# Patient Record
Sex: FEMALE | Race: WHITE
[De-identification: names, ages, dates, MRNs, and addresses within clinical notes are randomized per-mention and may not be internally consistent; named-entity substitution may affect disease eponyms.]

---

## 2022-03-08 ENCOUNTER — HOSPITAL ENCOUNTER (OUTPATIENT)
Dept: HOSPITAL 5 - OR | Age: 52
Discharge: HOME | End: 2022-03-08
Attending: SURGERY
Payer: COMMERCIAL

## 2022-03-08 VITALS — DIASTOLIC BLOOD PRESSURE: 84 MMHG | SYSTOLIC BLOOD PRESSURE: 132 MMHG

## 2022-03-08 DIAGNOSIS — E78.00: ICD-10-CM

## 2022-03-08 DIAGNOSIS — Z79.4: ICD-10-CM

## 2022-03-08 DIAGNOSIS — M19.90: ICD-10-CM

## 2022-03-08 DIAGNOSIS — Z79.899: ICD-10-CM

## 2022-03-08 DIAGNOSIS — E66.9: ICD-10-CM

## 2022-03-08 DIAGNOSIS — I12.0: Primary | ICD-10-CM

## 2022-03-08 DIAGNOSIS — Z98.51: ICD-10-CM

## 2022-03-08 DIAGNOSIS — Z87.440: ICD-10-CM

## 2022-03-08 DIAGNOSIS — N18.6: ICD-10-CM

## 2022-03-08 DIAGNOSIS — E11.22: ICD-10-CM

## 2022-03-08 DIAGNOSIS — Z98.891: ICD-10-CM

## 2022-03-08 DIAGNOSIS — Z98.890: ICD-10-CM

## 2022-03-08 LAB
BUN SERPL-MCNC: 70 MG/DL (ref 7–17)
BUN/CREAT SERPL: 14 %
CALCIUM SERPL-MCNC: 9 MG/DL (ref 8.4–10.2)
HCT VFR BLD CALC: 35.6 % (ref 30.3–42.9)
HEMOLYSIS INDEX: 99
HGB BLD-MCNC: 12.4 GM/DL (ref 10.1–14.3)
MCHC RBC AUTO-ENTMCNC: 35 % (ref 30–34)
MCV RBC AUTO: 84 FL (ref 79–97)
PLATELET # BLD: 259 K/MM3 (ref 140–440)
RBC # BLD AUTO: 4.24 M/MM3 (ref 3.65–5.03)

## 2022-03-08 PROCEDURE — 36821 AV FUSION DIRECT ANY SITE: CPT

## 2022-03-08 PROCEDURE — 81025 URINE PREGNANCY TEST: CPT

## 2022-03-08 PROCEDURE — 64415 NJX AA&/STRD BRCH PLXS IMG: CPT

## 2022-03-08 PROCEDURE — 64450 NJX AA&/STRD OTHER PN/BRANCH: CPT

## 2022-03-08 PROCEDURE — 36415 COLL VENOUS BLD VENIPUNCTURE: CPT

## 2022-03-08 PROCEDURE — 82962 GLUCOSE BLOOD TEST: CPT

## 2022-03-08 PROCEDURE — 80048 BASIC METABOLIC PNL TOTAL CA: CPT

## 2022-03-08 PROCEDURE — 85027 COMPLETE CBC AUTOMATED: CPT

## 2022-03-08 NOTE — ANESTHESIA CONSULTATION
Anesthesia Consult and Med Hx


Date of service: 03/08/22





- Airway


Anesthetic Teeth Evaluation: Dentures (upper dentures)


ROM Head & Neck: Adequate


Mental/Hyoid Distance: Adequate


Mallampati Class: Class III


Intubation Access Assessment: Possibly Difficult





- Pulmonary Exam


CTA: Yes





- Cardiac Exam


Cardiac Exam: RRR





- Pre-Operative Health Status


ASA Pre-Surgery Classification: ASA3


Proposed Anesthetic Plan: MAC


Nerve Block: Supraclavicular





- Pulmonary


Hx Smoking: No


Hx Asthma: No


Hx Respiratory Symptoms: No


Hx Sleep Apnea: No





- Cardiovascular System


Hx Hypertension: Yes (took carvedilol this AM)


Hx Heart Attack/AMI: No





- Central Nervous System


Hx Neuromuscular Disorder: No


Hx Back Pain: Yes (LOW BACK PAIN)


Hx Psychiatric Problems: No





- Gastrointestinal


Hx Gastroesophageal Reflux Disease: No





- Endocrine


Hx Renal Disease: Yes (Stage V CKD)


Hx Liver Disease: No


Hx Insulin Dependent Diabetes: Yes


Hx Thyroid Disease: No





- Hematic


Hx Anemia: No


Hx Sickle Cell Disease: No





- Other Systems


Hx Alcohol Use: No


Hx Substance Use: No


Hx Cancer: No


Hx Obesity: Yes (BMI 33)





- Additional Comments


Anesthesia Medical History Comments: No hx of anesthetic complications

## 2022-03-08 NOTE — OPERATIVE REPORT
Operative Report


Operative Report: 





Date of procedure: 3/8/2022


 


Pre-operative diagnosis:   Stage V Chronic Renal Insufficiency


 


Post-operative diagnosis: Same


 


Procedure(s):  Creation of Right Brachial Artery to Cephalic Vein Arteriovenous 

Fistula


 


Surgeon:  Ramon Olivier MD 


 


Assistant:  None


 


Anesthesia:  Regional/MAC


 


EBL:  Minimal


 


Counts:  Correct


 


Complications:  None


 


Condition:  Stable


 


Findings: Successful creation of right brachiocephalic arteriovenous fistula 

with palpable thrill and palpable radial pulse at the completion of the case.


 


Specimen:  None


 


Indications:





The patient is a 51-year-old female with a history of chronic renal 

insufficiency who was not yet on hemodialysis however it is anticipated that she

will require hemodialysis within the near future.  She will require permanent 

access to avoid a permacath placement.  She was worked up and found to be a 

suitable candidate for creation of an arteriovenous fistula.  She was given the 

risk, benefits, and alternative procedures and consented to the procedure.


 


Description of Procedure: 


 


The patient had a regional block of the patient's right arm was performed in the

preoperative area prior to being transported to the operating room.  Once the 

regional block was performed the patient was transported to the operating room 

and adequate sedation was given.  When the patient was sedated a timeout was 

performed and the patient's right arm was then prepped and draped in normal 

sterile fashion.  A transverse incision was then made and carried down to the 

cephalic vein using sharp dissection.  The vein was dissected out both 

proximally and distally and suture ligated and divided distally. I flushed the 

vein with heparinized saline and flow was controlled with a bulldog clamp.  I 

then dissected out the brachial artery through this incision circumferentially 

both proximal and distal and controlled the artery with vessel loops.  I 

systemically heparinized the patient with 3000 units of heparin IV and used 

angled DeBakey clamps to control flow through the artery.  I created an 

arteriotomy using an 11 blade and Rodriguez scissors.  I created an end to side 

anastomosis between the cephalic vein and brachial artery using a 6-0 Prolene in

running fashion.  Prior to completing the anastomosis I flashed the artery both 

proximally and distally and then flushed the anastomosis with heparinized saline

to remove any debris.  I then completed the anastomosis and removed all clamps 

allowing flow into the fistula which had an adequate thrill.  I achieved 

hemostasis with a combination of Surgicel and electrocautery.  Once hemostasis 

had been achieved I closed the wound in 2 layers using a 3-0 Vicryl in a running

fashion in the deep dermal layer and a 4-0 Monocryl in  running fashion in the 

subcuticular layer.  I then dressed the wound with Dermabond.  The patient 

tolerated the procedure well.  All sponge, needle, and instrument counts were 

correct.  The patient was taken to the recovery area in stable condition.

## 2022-03-08 NOTE — SHORT STAY SUMMARY
Short Stay Documentation


Date of service: 03/08/22


Narrative H&P: 





See H&P





- History


H&P: obtained from office





- Allergies and Medications


Current Medications: 


                                    Allergies





No Known Allergies Allergy (Verified 03/02/22 15:12)


   





                                Home Medications











 Medication  Instructions  Recorded  Confirmed  Last Taken  Type


 


Ergocalciferol(Vitamin D2)(Nf) 50 mcg PO 1XW 03/02/22 03/02/22 Unknown History





[Vitamin D (Nf)]     


 


Insulin Aspart Protam & Aspart 15 unit SQ BID 03/02/22 03/02/22 Unknown History





[NovoLOG Mix 70-30 Flexpen]     


 


carvediloL [Coreg] 25 mg PO BID 03/02/22 03/08/22 03/08/22 History


 


oxyCODONE /ACETAMINOPHEN [Percocet 1 tab PO Q6HR PRN 03/02/22 03/02/22 Unknown 

History





5/325]     








Active Medications





Cefazolin Sodium (Cefazolin/Sterile Water 2 Gm/20 Ml Syringe)  2 gm IV PREOP NR


   Stop: 03/08/22 21:00


Fentanyl (Fentanyl 100 Mcg/2 Ml Inj)  100 mcg IV ONCE PRN


   PRN Reason: sedation for nerve block


   Stop: 03/08/22 20:00


   Last Admin: 03/08/22 08:00 Dose:  100 mcg


   


Hydralazine HCl (Hydralazine 20 Mg/1 Ml Inj)  10 mg IV Q20M PRN


   PRN Reason: Hypertension


Sodium Chloride (Nacl 0.9% 1000 Ml)  1,000 mls @ 42 mls/hr IV AS DIRECT MARJ


   Stop: 03/08/22 23:59


   Last Admin: 03/08/22 07:00 Dose:  42 mls/hr


   


Midazolam HCl (Midazolam 2 Mg/2 Ml Inj)  2 mg IV PREOP NR


   Stop: 03/08/22 20:00


   Last Admin: 03/08/22 08:00 Dose:  2 mg


   











- Brief post op/procedure progress note


Date of procedure: 03/08/22


Pre-op diagnosis: Chronic Renal Insufficiency Stage V


Post-op diagnosis: same


Procedure: 





Creation of Right Brachiocephalic Arteriovenous Fistula


Anesthesia: MAC, regional


Surgeon: JACKI COVINGTON


Estimated blood loss: minimal


Pathology: none


Condition: stable





- Disposition


Condition at discharge: Good


Disposition: 01 HOME / SELF CARE / HOMELESS





Short Stay Discharge Plan


Activity: other (No heavy lifting with right arm for 2 weeks.  Use stress ball 

with right hand as often as possible.)


Wound: open to air, keep clean and dry, other (Okay to shower and wash the right

arm incision with soap and water but do not soak in water for 2 weeks.)


Follow up with: 


JACKI COVINGTON MD [Staff Physician] - 14 Days


Prescriptions: 


oxyCODONE /ACETAMINOPHEN [Percocet 5/325] 1 tab PO Q4HR #30 tab

## 2022-09-02 ENCOUNTER — HOSPITAL ENCOUNTER (INPATIENT)
Dept: HOSPITAL 5 - ED | Age: 52
LOS: 7 days | Discharge: HOME | DRG: 673 | End: 2022-09-09
Attending: STUDENT IN AN ORGANIZED HEALTH CARE EDUCATION/TRAINING PROGRAM | Admitting: INTERNAL MEDICINE
Payer: MEDICARE

## 2022-09-02 DIAGNOSIS — Z20.822: ICD-10-CM

## 2022-09-02 DIAGNOSIS — E87.2: ICD-10-CM

## 2022-09-02 DIAGNOSIS — E83.51: ICD-10-CM

## 2022-09-02 DIAGNOSIS — E66.9: ICD-10-CM

## 2022-09-02 DIAGNOSIS — M19.90: ICD-10-CM

## 2022-09-02 DIAGNOSIS — E87.5: ICD-10-CM

## 2022-09-02 DIAGNOSIS — E11.65: ICD-10-CM

## 2022-09-02 DIAGNOSIS — N18.6: ICD-10-CM

## 2022-09-02 DIAGNOSIS — N17.9: ICD-10-CM

## 2022-09-02 DIAGNOSIS — E11.22: ICD-10-CM

## 2022-09-02 DIAGNOSIS — Z71.6: ICD-10-CM

## 2022-09-02 DIAGNOSIS — E87.1: ICD-10-CM

## 2022-09-02 DIAGNOSIS — Z71.3: ICD-10-CM

## 2022-09-02 DIAGNOSIS — Z79.4: ICD-10-CM

## 2022-09-02 DIAGNOSIS — I12.0: Primary | ICD-10-CM

## 2022-09-02 LAB
ALBUMIN SERPL-MCNC: 3.8 G/DL (ref 3.9–5)
ALT SERPL-CCNC: 6 UNITS/L (ref 7–56)
BASOPHILS # (AUTO): 0.1 K/MM3 (ref 0–0.1)
BASOPHILS NFR BLD AUTO: 0.6 % (ref 0–1.8)
BUN SERPL-MCNC: 66 MG/DL (ref 7–17)
BUN/CREAT SERPL: 10 %
CALCIUM SERPL-MCNC: 8.2 MG/DL (ref 8.4–10.2)
EOSINOPHIL # BLD AUTO: 0.4 K/MM3 (ref 0–0.4)
EOSINOPHIL NFR BLD AUTO: 4.9 % (ref 0–4.3)
HCT VFR BLD CALC: 36.7 % (ref 30.3–42.9)
HEMOLYSIS INDEX: 47
HGB BLD-MCNC: 11.9 GM/DL (ref 10.1–14.3)
LYMPHOCYTES # BLD AUTO: 1.1 K/MM3 (ref 1.2–5.4)
LYMPHOCYTES NFR BLD AUTO: 11.9 % (ref 13.4–35)
MCHC RBC AUTO-ENTMCNC: 32 % (ref 30–34)
MCV RBC AUTO: 84 FL (ref 79–97)
MONOCYTES # (AUTO): 0.3 K/MM3 (ref 0–0.8)
MONOCYTES % (AUTO): 3.5 % (ref 0–7.3)
PLATELET # BLD: 263 K/MM3 (ref 140–440)
RBC # BLD AUTO: 4.37 M/MM3 (ref 3.65–5.03)

## 2022-09-02 PROCEDURE — 82962 GLUCOSE BLOOD TEST: CPT

## 2022-09-02 PROCEDURE — 80048 BASIC METABOLIC PNL TOTAL CA: CPT

## 2022-09-02 PROCEDURE — 84703 CHORIONIC GONADOTROPIN ASSAY: CPT

## 2022-09-02 PROCEDURE — 64450 NJX AA&/STRD OTHER PN/BRANCH: CPT

## 2022-09-02 PROCEDURE — 36415 COLL VENOUS BLD VENIPUNCTURE: CPT

## 2022-09-02 PROCEDURE — 81001 URINALYSIS AUTO W/SCOPE: CPT

## 2022-09-02 PROCEDURE — C1752 CATH,HEMODIALYSIS,SHORT-TERM: HCPCS

## 2022-09-02 PROCEDURE — C1768 GRAFT, VASCULAR: HCPCS

## 2022-09-02 PROCEDURE — 80053 COMPREHEN METABOLIC PANEL: CPT

## 2022-09-02 PROCEDURE — 85025 COMPLETE CBC W/AUTO DIFF WBC: CPT

## 2022-09-02 PROCEDURE — U0003 INFECTIOUS AGENT DETECTION BY NUCLEIC ACID (DNA OR RNA); SEVERE ACUTE RESPIRATORY SYNDROME CORONAVIRUS 2 (SARS-COV-2) (CORONAVIRUS DISEASE [COVID-19]), AMPLIFIED PROBE TECHNIQUE, MAKING USE OF HIGH THROUGHPUT TECHNOLOGIES AS DESCRIBED BY CMS-2020-01-R: HCPCS

## 2022-09-02 PROCEDURE — 36581 REPLACE TUNNELED CV CATH: CPT

## 2022-09-02 PROCEDURE — C1750 CATH, HEMODIALYSIS,LONG-TERM: HCPCS

## 2022-09-02 PROCEDURE — 80074 ACUTE HEPATITIS PANEL: CPT

## 2022-09-02 PROCEDURE — 85007 BL SMEAR W/DIFF WBC COUNT: CPT

## 2022-09-02 PROCEDURE — 84100 ASSAY OF PHOSPHORUS: CPT

## 2022-09-02 PROCEDURE — 71045 X-RAY EXAM CHEST 1 VIEW: CPT

## 2022-09-02 PROCEDURE — C1769 GUIDE WIRE: HCPCS

## 2022-09-02 PROCEDURE — 93005 ELECTROCARDIOGRAM TRACING: CPT

## 2022-09-02 PROCEDURE — 36556 INSERT NON-TUNNEL CV CATH: CPT

## 2022-09-03 LAB
RBC #/AREA URNS HPF: < 1 /HPF (ref 0–6)
WBC #/AREA URNS HPF: < 1 /HPF (ref 0–6)

## 2022-09-03 PROCEDURE — B548ZZA ULTRASONOGRAPHY OF SUPERIOR VENA CAVA, GUIDANCE: ICD-10-PCS | Performed by: STUDENT IN AN ORGANIZED HEALTH CARE EDUCATION/TRAINING PROGRAM

## 2022-09-03 PROCEDURE — B5181ZA FLUOROSCOPY OF SUPERIOR VENA CAVA USING LOW OSMOLAR CONTRAST, GUIDANCE: ICD-10-PCS | Performed by: STUDENT IN AN ORGANIZED HEALTH CARE EDUCATION/TRAINING PROGRAM

## 2022-09-03 PROCEDURE — 5A1D70Z PERFORMANCE OF URINARY FILTRATION, INTERMITTENT, LESS THAN 6 HOURS PER DAY: ICD-10-PCS | Performed by: INTERNAL MEDICINE

## 2022-09-03 PROCEDURE — 02H633Z INSERTION OF INFUSION DEVICE INTO RIGHT ATRIUM, PERCUTANEOUS APPROACH: ICD-10-PCS | Performed by: STUDENT IN AN ORGANIZED HEALTH CARE EDUCATION/TRAINING PROGRAM

## 2022-09-03 RX ADMIN — INSULIN LISPRO SCH UNIT: 100 INJECTION, SOLUTION INTRAVENOUS; SUBCUTANEOUS at 16:30

## 2022-09-03 RX ADMIN — HEPARIN SODIUM ONE UNIT: 1000 INJECTION, SOLUTION INTRAVENOUS; SUBCUTANEOUS at 09:48

## 2022-09-03 RX ADMIN — INSULIN LISPRO SCH: 100 INJECTION, SOLUTION INTRAVENOUS; SUBCUTANEOUS at 22:38

## 2022-09-03 RX ADMIN — HYDRALAZINE HYDROCHLORIDE PRN MG: 20 INJECTION INTRAMUSCULAR; INTRAVENOUS at 06:49

## 2022-09-03 RX ADMIN — Medication SCH ML: at 22:37

## 2022-09-03 RX ADMIN — INSULIN LISPRO SCH: 100 INJECTION, SOLUTION INTRAVENOUS; SUBCUTANEOUS at 11:30

## 2022-09-03 RX ADMIN — HEPARIN SODIUM ONE UNIT: 1000 INJECTION, SOLUTION INTRAVENOUS; SUBCUTANEOUS at 09:47

## 2022-09-03 RX ADMIN — Medication SCH ML: at 13:46

## 2022-09-03 RX ADMIN — INSULIN LISPRO SCH UNIT: 100 INJECTION, SOLUTION INTRAVENOUS; SUBCUTANEOUS at 07:30

## 2022-09-03 NOTE — EVENT NOTE
Date: 09/03/22





Patient seen and examined


52-year-old female presented to the hospital to establish new dialysis


Placed on PermCath by vascular, plan to initiate dialysis today


Patient will need outpatient dialysis center set up


Continue to follow clinically

## 2022-09-03 NOTE — OPERATIVE REPORT
Operative Report


Operative Report: 





Exam: Ultrasound and fluoroscopic guided placement of Vas-Cath





Clinical indication: Patient with a history of end-stage renal disease who was 

progressed to require acute dialysis initiation





Date: 9/3/2022





Procedure: Following an explanation of the risk, benefits and alternatives; 

written informed consent was obtained.  The patient was brought to the 

angiographic suite and placed in supine position on the examination table.  

Initial ultrasound evaluation of her right neck demonstrated a patent right int

ernal jugular vein.  The patient's right neck and chest wall were prepped and 

draped in the usual sterile fashion.  2% lidocaine was used for anesthesia.





Under ultrasound guidance, the right internal jugular vein was cannulated using 

a 7 cm 18-gauge needle.  A 0.035 guidewire was then advanced into the IVC under 

fluoroscopy to document intravenous positioning and for anchoring.  The needle 

was removed and following serial dilation over the guidewire under fluoroscopy, 

a 15 cm precurved Vas-Cath was placed over the guidewire under fluoroscopy and 

positioned with the tip in the proximal right atrium.  The guidewire was 

removed.  Both ports flushed and aspirated easily and were then locked with 

appropriate volumes of heparin.  The catheter was securely fastened to the skin 

surface using 2-0 Ethilon suture and a sterile dressing applied.





The patient tolerated the procedure well.  There were no immediate postprocedure

complications.





Conscious sedation was performed under the guidance of radiologic nursing.  

Continuous cardiopulmonary monitoring was utilized.





Impression: Ultrasound and fluoroscopic guided placement of Vas-Cath via the 

right internal jugular vein.

## 2022-09-03 NOTE — CONSULTATION
History of Present Illness





- Reason for Consult


Consult date: 09/03/22


end stage renal disease





Past History


Past Medical History: arthritis, diabetes, hypertension, renal failure


Past Surgical History: No surgical history


Social history: no significant social history


Family history: no significant family history





Medications and Allergies


                                    Allergies











Allergy/AdvReac Type Severity Reaction Status Date / Time


 


No Known Allergies Allergy   Verified 03/02/22 15:12











                                Home Medications











 Medication  Instructions  Recorded  Confirmed  Last Taken  Type


 


Ergocalciferol(Vitamin D2)(Nf) 50 mcg PO 1XW 03/02/22 09/03/22 08/28/22 History





[Vitamin D (Nf)]     


 


Insulin Aspart Protam & Aspart 20 unit SQ BID 03/02/22 09/03/22 1 Day Ago Hist

ory





[NovoLOG Mix 70-30 Flexpen]    ~09/02/22 


 


carvediloL [Coreg] 25 mg PO BID 03/02/22 09/03/22 1 Day Ago History





    ~09/02/22 


 


oxyCODONE /ACETAMINOPHEN [Percocet 1 tab PO Q6HR PRN 03/02/22 09/03/22 Unknown 

History





5/325]     











Active Meds: 


Active Medications





Acetaminophen (Acetaminophen 325 Mg Tab)  650 mg PO Q4H PRN


   PRN Reason: Pain MILD(1-3)/Fever >100.5/HA


Dextrose (Dextrose 50% In Water (25gm) 50 Ml Syringe)  0 ml IV Q30MIN PRN; 

Protocol


   PRN Reason: Hypoglycemia


Heparin Sodium (Porcine) (Heparin 10,000 Units/10 Ml Vial)  3,000 unit IV ANANT 

PRN


   PRN Reason: hemodialysis


Hydralazine HCl (Hydralazine 20 Mg/1 Ml Inj)  10 mg IV Q4H PRN


   PRN Reason: SBP greater than 160


   Last Admin: 09/03/22 06:49 Dose:  10 mg


   


Sodium Chloride (Nacl 0.9%)  100 mls @ 999 mls/hr IV ANANT PRN


   PRN Reason: Hypotension


Insulin Human Lispro (Insulin Lispro 100 Unit/Ml)  0 unit SUB-Q ACHS Count includes the [facility]; 

Protocol


   Last Admin: 09/03/22 07:30 Dose:  6 unit


   


Magnesium Hydroxide (Magnesium Hydroxide (Mom) Oral Liqd Udc)  30 ml PO Q4H PRN


   PRN Reason: Constipation


Morphine Sulfate (Morphine 2 Mg/1 Ml Inj)  2 mg IV Q4H PRN


   PRN Reason: Pain, Moderate (4-6)


Morphine Sulfate (Morphine 4 Mg/1 Ml Inj)  4 mg IV Q4H PRN


   PRN Reason: Pain , Severe (7-10)


Ondansetron HCl (Ondansetron 4 Mg/2 Ml Inj)  4 mg IV Q6H PRN


   PRN Reason: Nausea And Vomiting


Sodium Chloride (Sodium Chloride 0.9% 10 Ml Flush Syringe)  10 ml IV BID MARJ


Sodium Chloride (Sodium Chloride 0.9% 10 Ml Flush Syringe)  10 ml IV PRN PRN


   PRN Reason: LINE FLUSH











Exam





- Vital Signs


Vital signs: 


                                   Vital Signs











Temp Pulse Resp BP Pulse Ox


 


 98.7 F   72   20   193/82   99 


 


 09/02/22 13:01  09/02/22 13:01  09/02/22 13:01  09/02/22 13:01  09/02/22 13:01














Results





- Lab Results





                                 09/04/22 05:21





                                 09/04/22 05:21


                             Most recent lab results











Calcium  8.2 mg/dL (8.4-10.2)  L  09/02/22  13:27    














Assessment and Plan


1. ESRD:


Patient with known h/o CKD-5.


Progressed to ESRD and presented with uremia.


Patient require hemodialysis due to worsening renal function and associated 

hyperkalemia, metabolic acidosis and uremia.


D/w patient about the indications, benefits, risks and alternatives involved in 

hemodialysis.  She voiced understanding and gave verbal consent.


Vascular consulted for hemodialysis catheter placement.


Hemodialysis: 9/3.


 


2. FEN:


Hyperkalemia, HD today.


Metabolic acidosis, HD today.


Monitor lytes and volume status.





3. Hypertension:


Adjust meds as needed.


Volume control.


Monitor BP.





4. DM-2:


SSI.





5. Tobacco smoking:


Counseled.











Subjective:


Patient was seen and examined at the bedside.











Examination:


General appearance: well-developed, well nourished, appears stated age, not in 

distress


HEENT: no icterus


Neck: trachea midline


Respiratory: ctab


Heart: S1S2, regular, no murmur


Abdomen: soft, bowel sounds heard, NT, no palpable mass


Integumentary: no obvious rash


Neurologic: AO, conversing, able to move extremities


Ext: no edema


Hemodialysis access: R IJ non-tunnel catheter

## 2022-09-03 NOTE — CONSULTATION
History of Present Illness





- Reason for Consult


Consult date: 09/03/22





- History of Present Illness





Patient with a history of diabetes and end-stage renal disease who has 

progressed to the point of requiring dialysis.  She was sent to the ER for 

dialysis access and initiation of dialysis.  Patient was subsequently admitted 

and consult placed for dialysis access





Past History


Past Medical History: arthritis, diabetes, hypertension, renal failure


Past Surgical History: No surgical history


Social history: no significant social history


Family history: no significant family history





Medications and Allergies


                                    Allergies











Allergy/AdvReac Type Severity Reaction Status Date / Time


 


No Known Allergies Allergy   Verified 03/02/22 15:12











                                Home Medications











 Medication  Instructions  Recorded  Confirmed  Last Taken  Type


 


Ergocalciferol(Vitamin D2)(Nf) 50 mcg PO 1XW 03/02/22 09/03/22 08/28/22 History





[Vitamin D (Nf)]     


 


Insulin Aspart Protam & Aspart 20 unit SQ BID 03/02/22 09/03/22 1 Day Ago 

History





[NovoLOG Mix 70-30 Flexpen]    ~09/02/22 


 


carvediloL [Coreg] 25 mg PO BID 03/02/22 09/03/22 1 Day Ago History





    ~09/02/22 


 


oxyCODONE /ACETAMINOPHEN [Percocet 1 tab PO Q6HR PRN 03/02/22 09/03/22 Unknown 

History





5/325]     











Active Meds: 


Active Medications





Acetaminophen (Acetaminophen 325 Mg Tab)  650 mg PO Q4H PRN


   PRN Reason: Pain MILD(1-3)/Fever >100.5/HA


Dextrose (Dextrose 50% In Water (25gm) 50 Ml Syringe)  0 ml IV Q30MIN PRN; 

Protocol


   PRN Reason: Hypoglycemia


Heparin Sodium (Porcine) (Heparin 10,000 Units/10 Ml Vial)  3,000 unit IV ANANT 

PRN


   PRN Reason: hemodialysis


Hydralazine HCl (Hydralazine 20 Mg/1 Ml Inj)  10 mg IV Q4H PRN


   PRN Reason: SBP greater than 160


   Last Admin: 09/03/22 06:49 Dose:  10 mg


   


Sodium Chloride (Nacl 0.9%)  100 mls @ 999 mls/hr IV ANANT PRN


   PRN Reason: Hypotension


Insulin Human Lispro (Insulin Lispro 100 Unit/Ml)  0 unit SUB-Q ACHS MARJ; 

Protocol


   Last Admin: 09/03/22 07:30 Dose:  6 unit


   


Magnesium Hydroxide (Magnesium Hydroxide (Mom) Oral Liqd Udc)  30 ml PO Q4H PRN


   PRN Reason: Constipation


Morphine Sulfate (Morphine 2 Mg/1 Ml Inj)  2 mg IV Q4H PRN


   PRN Reason: Pain, Moderate (4-6)


Morphine Sulfate (Morphine 4 Mg/1 Ml Inj)  4 mg IV Q4H PRN


   PRN Reason: Pain , Severe (7-10)


Ondansetron HCl (Ondansetron 4 Mg/2 Ml Inj)  4 mg IV Q6H PRN


   PRN Reason: Nausea And Vomiting


Sodium Chloride (Sodium Chloride 0.9% 10 Ml Flush Syringe)  10 ml IV BID MARJ


Sodium Chloride (Sodium Chloride 0.9% 10 Ml Flush Syringe)  10 ml IV PRN PRN


   PRN Reason: LINE FLUSH











Review of Systems


All systems: negative





Exam





- Constitutional


Vitals: 


                                        











Temp Pulse Resp BP Pulse Ox


 


 98.8 F   82   18   193/83   96 


 


 09/03/22 06:24  09/03/22 06:24  09/03/22 06:24  09/03/22 06:49  09/03/22 06:24











General appearance: Present: no acute distress





- EENT


Eyes: Present: EOM intact


ENT: hearing intact





- Neck


Neck: Present: supple, normal ROM





- Respiratory


Respiratory effort: normal





- Abdominal


General gastrointestinal: Present: deferred





- Rectal


Rectal Exam: deferred





- Psychiatric


Psychiatric: appropriate mood/affect, cooperative





Results





- Labs


CBC & Chem 7: 


                                 09/02/22 13:27





                                 09/02/22 13:27


Labs: 


                              Abnormal lab results











  09/02/22 09/02/22 Range/Units





  13:27 13:27 


 


MCH  27 L   (28-32)  pg


 


Lymph % (Auto)  11.9 L   (13.4-35.0)  %


 


Eos % (Auto)  4.9 H   (0.0-4.3)  %


 


Lymph # (Auto)  1.1 L   (1.2-5.4)  K/mm3


 


Seg Neutrophils %  79.1 H   (40.0-70.0)  %


 


Sodium   129 L  (137-145)  mmol/L


 


Potassium   5.3 H  (3.6-5.0)  mmol/L


 


Carbon Dioxide   19 L  (22-30)  mmol/L


 


BUN   66 H  (7-17)  mg/dL


 


Creatinine   6.9 H  (0.6-1.2)  mg/dL


 


Glucose   318 H  ()  mg/dL


 


Calcium   8.2 L  (8.4-10.2)  mg/dL


 


ALT   6 L  (7-56)  units/L


 


Alkaline Phosphatase   131 H  ()  units/L


 


Albumin   3.8 L  (3.9-5)  g/dL














Assessment and Plan





Patient will be brought down to the Cath Lab today for placement of a Vas-Cath 

for dialysis initiation.  Depending on nephrology recommendations, this can be 

converted to a PermCath next week.

## 2022-09-03 NOTE — EMERGENCY DEPARTMENT REPORT
ED General Adult HPI





- General


Chief complaint: Weakness


Stated complaint: DIALYSIS


Time Seen by Provider: 09/02/22 22:56


Source: patient


Mode of arrival: Ambulatory


Limitations: No Limitations





- History of Present Illness


Initial comments: 





Patient is a 52-year-old female sent by Dr. Barillas for dialysis.  Patient 

reports generalized weakness, decreased appetite, malaise and insomnia over the 

past several days.  Was found to be in acute renal failure per outside labs.  

Patient has history of insulin-dependent diabetes and hypertension.


Severity scale (0 -10): 0





- Related Data


                                Home Medications











 Medication  Instructions  Recorded  Confirmed  Last Taken


 


Ergocalciferol(Vitamin D2)(Nf) 50 mcg PO 1XW 03/02/22 09/03/22 08/28/22





[Vitamin D (Nf)]    


 


Insulin Aspart Protam & Aspart 20 unit SQ BID 03/02/22 09/03/22 1 Day Ago





[NovoLOG Mix 70-30 Flexpen]    ~09/02/22


 


carvediloL [Coreg] 25 mg PO BID 03/02/22 09/03/22 1 Day Ago





    ~09/02/22


 


oxyCODONE /ACETAMINOPHEN [Percocet 1 tab PO Q6HR PRN 03/02/22 09/03/22 Unknown





5/325]    











                                    Allergies











Allergy/AdvReac Type Severity Reaction Status Date / Time


 


No Known Allergies Allergy   Verified 03/02/22 15:12














ED Review of Systems


ROS: 


Stated complaint: DIALYSIS


Other details as noted in HPI





Constitutional: malaise, weakness


Respiratory: denies: cough, shortness of breath, wheezing


Cardiovascular: denies: chest pain, palpitations


Gastrointestinal: denies: abdominal pain, nausea, diarrhea


Genitourinary: denies: urgency, dysuria, discharge


Musculoskeletal: denies: back pain, joint swelling, arthralgia


Skin: denies: rash, lesions


Neurological: denies: headache, weakness, paresthesias


Psychiatric: denies: anxiety, depression





ED Past Medical Hx





- Past Medical History


Hx Hypertension: Yes (took carvedilol this AM)


Hx Heart Attack/AMI: No


Hx Diabetes: Yes


Hx Liver Disease: No


Hx Renal Disease: Yes (Stage V CKD)


Hx Sickle Cell Disease: No


Hx Arthritis: Yes (BOTH LEGS AND FINGERS)


Hx Asthma: No


Hx HIV: No





- Social History


Smoking Status: Never Smoker





- Medications


Home Medications: 


                                Home Medications











 Medication  Instructions  Recorded  Confirmed  Last Taken  Type


 


Ergocalciferol(Vitamin D2)(Nf) 50 mcg PO 1XW 03/02/22 09/03/22 08/28/22 History





[Vitamin D (Nf)]     


 


Insulin Aspart Protam & Aspart 20 unit SQ BID 03/02/22 09/03/22 1 Day Ago 

History





[NovoLOG Mix 70-30 Flexpen]    ~09/02/22 


 


carvediloL [Coreg] 25 mg PO BID 03/02/22 09/03/22 1 Day Ago History





    ~09/02/22 


 


oxyCODONE /ACETAMINOPHEN [Percocet 1 tab PO Q6HR PRN 03/02/22 09/03/22 Unknown 

History





5/325]     














ED Physical Exam





- General


Limitations: No Limitations


General appearance: alert, in no apparent distress





- Head


Head exam: Present: atraumatic, normocephalic





- Respiratory


Respiratory exam: Present: normal lung sounds bilaterally.  Absent: respiratory 

distress





- Cardiovascular


Cardiovascular Exam: Present: regular rate, normal rhythm, normal heart sounds





- GI/Abdominal


GI/Abdominal exam: Present: soft.  Absent: distended, tenderness





- Rectal


Rectal exam: Present: deferred





- Neurological Exam


Neurological exam: Present: alert, oriented X3





- Psychiatric


Psychiatric exam: Present: normal affect, normal mood





- Skin


Skin exam: Present: warm, dry, intact, normal color





ED Course


                                   Vital Signs











  09/02/22 09/02/22 09/02/22





  13:01 23:01 23:53


 


Temperature 98.7 F 98.1 F 


 


Pulse Rate 72  70


 


Respiratory 20  14





Rate   


 


Blood Pressure   


 


Blood Pressure 193/82  194/73





[Left]   


 


O2 Sat by Pulse 99  99





Oximetry   














  09/02/22 09/03/22 09/03/22





  23:56 00:01 00:07


 


Temperature   


 


Pulse Rate  69 


 


Respiratory 16 14 





Rate   


 


Blood Pressure  202/82 202/82


 


Blood Pressure   





[Left]   


 


O2 Sat by Pulse  99 





Oximetry   














  09/03/22 09/03/22 09/03/22





  00:09 00:17 00:23


 


Temperature   


 


Pulse Rate   64


 


Respiratory   





Rate   


 


Blood Pressure   


 


Blood Pressure   193/75





[Left]   


 


O2 Sat by Pulse 100 100 





Oximetry   














  09/03/22 09/03/22 09/03/22





  00:31 00:45 00:51


 


Temperature   


 


Pulse Rate 70 67 68


 


Respiratory 15 13 





Rate   


 


Blood Pressure 187/83 186/79 186/79


 


Blood Pressure   





[Left]   


 


O2 Sat by Pulse 99 99 





Oximetry   














  09/03/22 09/03/22 09/03/22





  01:01 01:15 01:31


 


Temperature   


 


Pulse Rate 72 79 72


 


Respiratory 18 18 14





Rate   


 


Blood Pressure 184/82 163/76 162/73


 


Blood Pressure   





[Left]   


 


O2 Sat by Pulse 99 98 97





Oximetry   














  09/03/22 09/03/22 09/03/22





  01:45 02:01 02:15


 


Temperature   


 


Pulse Rate 79 78 80


 


Respiratory 25 H 10 L 14





Rate   


 


Blood Pressure 159/73 141/64 154/67


 


Blood Pressure   





[Left]   


 


O2 Sat by Pulse 97 97 98





Oximetry   














  09/03/22 09/03/22





  02:30 02:49


 


Temperature  


 


Pulse Rate  78


 


Respiratory  19





Rate  


 


Blood Pressure 165/68 165/68


 


Blood Pressure  





[Left]  


 


O2 Sat by Pulse 99 98





Oximetry  














ED Medical Decision Making





- Lab Data


Result diagrams: 


                                 09/02/22 13:27





                                 09/02/22 13:27





- Medical Decision Making





Patient sent by  for acute renal failure and urgent dialysis.  

Creatinine 6.9.  Potassium 5.3.  No acute EKG changes noted.  I discussed case 

with on-call vascular surgeon Dr. العلي who will consult for placement of Vas-

Cath later this morning.  I contacted Dr. Currie regarding today's lab results.

  Will consult for dialysis later today.  Dr. Leavitt to admit.


Critical care attestation.: 


If time is entered above; I have spent that time in minutes in the direct care 

of this critically ill patient, excluding procedure time.








ED Disposition


Clinical Impression: 


 Acute renal failure, Hyperkalemia





Disposition: 09 ADMITTED AS INPATIENT


Is pt being admited?: Yes


Condition: Stable

## 2022-09-04 LAB
ANISOCYTOSIS BLD QL SMEAR: (no result)
BAND NEUTROPHILS # (MANUAL): 0.1 K/MM3
BUN SERPL-MCNC: 42 MG/DL (ref 7–17)
BUN/CREAT SERPL: 7 %
CALCIUM SERPL-MCNC: 8.2 MG/DL (ref 8.4–10.2)
HCT VFR BLD CALC: 34 % (ref 30.3–42.9)
HEMOLYSIS INDEX: 3
HGB BLD-MCNC: 11.3 GM/DL (ref 10.1–14.3)
MCHC RBC AUTO-ENTMCNC: 33 % (ref 30–34)
MCV RBC AUTO: 84 FL (ref 79–97)
MYELOCYTES # (MANUAL): 0 K/MM3
PLATELET # BLD: 206 K/MM3 (ref 140–440)
PROMYELOCYTES # (MANUAL): 0 K/MM3
RBC # BLD AUTO: 4.04 M/MM3 (ref 3.65–5.03)
TOTAL CELLS COUNTED BLD: 100

## 2022-09-04 RX ADMIN — HYDRALAZINE HYDROCHLORIDE PRN MG: 20 INJECTION INTRAMUSCULAR; INTRAVENOUS at 06:11

## 2022-09-04 RX ADMIN — ACETAMINOPHEN PRN MG: 325 TABLET ORAL at 09:01

## 2022-09-04 RX ADMIN — INSULIN LISPRO SCH UNIT: 100 INJECTION, SOLUTION INTRAVENOUS; SUBCUTANEOUS at 22:34

## 2022-09-04 RX ADMIN — ONDANSETRON PRN MG: 2 INJECTION INTRAMUSCULAR; INTRAVENOUS at 08:52

## 2022-09-04 RX ADMIN — Medication SCH: at 11:55

## 2022-09-04 RX ADMIN — Medication SCH ML: at 22:34

## 2022-09-04 RX ADMIN — INSULIN LISPRO SCH UNIT: 100 INJECTION, SOLUTION INTRAVENOUS; SUBCUTANEOUS at 16:30

## 2022-09-04 RX ADMIN — INSULIN LISPRO SCH UNIT: 100 INJECTION, SOLUTION INTRAVENOUS; SUBCUTANEOUS at 07:30

## 2022-09-04 RX ADMIN — Medication SCH ML: at 08:55

## 2022-09-04 RX ADMIN — INSULIN LISPRO SCH: 100 INJECTION, SOLUTION INTRAVENOUS; SUBCUTANEOUS at 11:30

## 2022-09-04 NOTE — PROGRESS NOTE
Assessment and Plan


1. ESRD:


Patient with known h/o CKD-5.


Progressed to ESRD and presented with uremia.


Patient started on hemodialysis due to worsening renal function and associated 

hyperkalemia, metabolic acidosis and uremia.


Hemodialysis: 9/3.


 


2. FEN:


Hyperkalemia, improved with HD.


Metabolic acidosis, improved with HD.


Monitor lytes and volume status.





3. Hypertension:


Adjust meds as needed.


Volume control.


Monitor BP.





4. DM-2:


SSI.











Subjective:


Patient was seen and examined at the bedside.











Examination:


General appearance: well-developed, well nourished, appears stated age, not in 

distress


HEENT: no icterus


Neck: trachea midline


Respiratory: ctab


Heart: S1S2, regular, no murmur


Abdomen: soft, bowel sounds heard, NT, no palpable mass


Integumentary: no obvious rash


Neurologic: AO, conversing, able to move extremities


Ext: no edema


Hemodialysis access: R IJ non-tunnel catheter








Subjective


Date of service: 09/04/22





Objective





- Vital Signs


Vital signs: 


                               Vital Signs - 12hr











  09/03/22 09/03/22 09/03/22





  23:00 23:01 23:03


 


Temperature 98.2 F 98.2 F 


 


Pulse Rate 89  89


 


Respiratory 18 18 18





Rate   


 


Blood Pressure 138/58  


 


Blood Pressure   138/58





[Left]   


 


O2 Sat by Pulse 96  95





Oximetry   














  09/04/22 09/04/22 09/04/22





  06:04 06:11 07:31


 


Temperature 98.5 F  


 


Pulse Rate   


 


Respiratory 18  





Rate   


 


Blood Pressure 170/65 170/65 


 


Blood Pressure   





[Left]   


 


O2 Sat by Pulse   98





Oximetry   














- Lab





                                 09/04/22 05:21





                                 09/05/22 05:58


                             Most recent lab results











Calcium  8.2 mg/dL (8.4-10.2)  L  09/04/22  05:21    














Medications & Allergies





- Medications


Allergies/Adverse Reactions: 


                                    Allergies





No Known Allergies Allergy (Verified 03/02/22 15:12)


   








Home Medications: 


                                Home Medications











 Medication  Instructions  Recorded  Confirmed  Last Taken  Type


 


Ergocalciferol(Vitamin D2)(Nf) 50 mcg PO 1XW 03/02/22 09/03/22 08/28/22 History





[Vitamin D (Nf)]     


 


Insulin Aspart Protam & Aspart 20 unit SQ BID 03/02/22 09/03/22 1 Day Ago 

History





[NovoLOG Mix 70-30 Flexpen]    ~09/02/22 


 


carvediloL [Coreg] 25 mg PO BID 03/02/22 09/03/22 1 Day Ago History





    ~09/02/22 


 


oxyCODONE /ACETAMINOPHEN [Percocet 1 tab PO Q6HR PRN 03/02/22 09/03/22 Unknown 

History





5/325]     











Active Medications: 














Generic Name Dose Route Start Last Admin





  Trade Name Freq  PRN Reason Stop Dose Admin


 


Acetaminophen  650 mg  09/03/22 04:24  09/04/22 09:01





  Acetaminophen 325 Mg Tab  PO   650 mg





  Q4H PRN   Administration





  Pain MILD(1-3)/Fever >100.5/HA  


 


Dextrose  0 ml  09/03/22 04:24 





  Dextrose 50% In Water (25gm) 50 Ml Syringe  IV  





  Q30MIN PRN  





  Hypoglycemia  





  Protocol  


 


Heparin Sodium (Porcine)  3,000 unit  09/03/22 08:00 





  Heparin 10,000 Units/10 Ml Vial  IV  





  ANANT PRN  





  hemodialysis  


 


Hydralazine HCl  10 mg  09/03/22 06:28  09/04/22 06:11





  Hydralazine 20 Mg/1 Ml Inj  IV   10 mg





  Q4H PRN   Administration





  SBP greater than 160  


 


Sodium Chloride  100 mls @ 999 mls/hr  09/03/22 08:00 





  Nacl 0.9%  IV  





  ANANT PRN  





  Hypotension  


 


Insulin Human Lispro  0 unit  09/03/22 07:30  09/04/22 07:30





  Insulin Lispro 100 Unit/Ml  SUB-Q   4 unit





  ACHS MARJ   Administration





  Protocol  


 


Magnesium Hydroxide  30 ml  09/03/22 04:24 





  Magnesium Hydroxide (Mom) Oral Liqd Udc  PO  





  Q4H PRN  





  Constipation  


 


Morphine Sulfate  2 mg  09/03/22 04:24 





  Morphine 2 Mg/1 Ml Inj  IV  





  Q4H PRN  





  Pain, Moderate (4-6)  


 


Morphine Sulfate  4 mg  09/03/22 04:24 





  Morphine 4 Mg/1 Ml Inj  IV  





  Q4H PRN  





  Pain , Severe (7-10)  


 


Ondansetron HCl  4 mg  09/03/22 06:30  09/04/22 08:52





  Ondansetron 4 Mg/2 Ml Inj  IV   4 mg





  Q6H PRN   Administration





  Nausea And Vomiting  


 


Sodium Chloride  10 ml  09/03/22 10:00  09/04/22 08:55





  Sodium Chloride 0.9% 10 Ml Flush Syringe  IV   10 ml





  BID MARJ   Administration


 


Sodium Chloride  10 ml  09/03/22 04:24 





  Sodium Chloride 0.9% 10 Ml Flush Syringe  IV  





  PRN PRN  





  LINE FLUSH

## 2022-09-04 NOTE — PROGRESS NOTE
Assessment and Plan





1.  End-stage renal disease-needing dialysis


-Placed on permacath and started on hemodialysis


-Need outpatient hemodialysis set up on discharge


-Nephrology following





2.  Diabetes mellitus, continue sliding scale of insulin





3.  Hypertension, adjust BP medications as directed








Plan:





1.  Patient admitted and placed on telemetry





2.  Consult placed to nephrology for evaluation and recommendations





3.  resumed routine home medications once reconciled.





4.  Patient placed on sliding scale insulin.  We will monitor Accu-Cheks.








DVT prophylaxis: Sequential compression device





CODE STATUS: Full code





Subjective


Date of service: 09/04/22


Interval history: 





Patient seen and examined.  Medical records and medication list reviewed.  


No acute event overnight noted by the RN.  


Patient complains of nausea


Discussed plan of care at bedside with patient.








Objective





- Exam


Narrative Exam: 





GENERAL:  well-developed and well-nourished female  lying on bed appeared to be 

in no discomfort. 


HEENT: Normocephalic.  Atraumatic.  No conjunctival congestion or icterus. 

Patient has moist mucous membranes.


NECK: Supple.  Trachea midline.


CHEST/LUNGS: Clear to auscultated bilaterally, breathing nonlabored. No wheezes 

crackles or rhonchi.


HEART/CARDIOVASCULAR: Regular in rate and rhythm.  S1 and S2 positive.


ABDOMEN: Abdomen is soft, nontender.  Patient has normal bowel sounds.  


SKIN: There is no rash.  Warm and dry.


NEURO:  No focal motor deficit.  Follows command.


MUSCULOSKELETAL: No joint effusion or tenderness.


EXTRIMITY: No edema, no cyanosis or clubbing.


PSYCH:  Cooperative.








- Constitutional


Vitals: 


                               Vital Signs - 12hr











  09/04/22 09/04/22 09/04/22





  06:04 06:11 07:31


 


Temperature 98.5 F  


 


Respiratory 18  





Rate   


 


Blood Pressure 170/65 170/65 


 


O2 Sat by Pulse   98





Oximetry   














- Labs


CBC & Chem 7: 


                                 09/04/22 05:21





                                 09/04/22 05:21


Labs: 


                              Abnormal lab results











  09/03/22 09/03/22 09/03/22 Range/Units





  08:14 16:29 21:22 


 


Seg Neuts % (Manual)     (40.0-70.0)  %


 


Seg Neutrophils # Man     (1.8-7.7)  K/mm3


 


Sodium     (137-145)  mmol/L


 


BUN     (7-17)  mg/dL


 


Creatinine     (0.6-1.2)  mg/dL


 


Glucose     ()  mg/dL


 


POC Glucose  313 H  176 H  146 H  ()  mg/dL


 


Calcium     (8.4-10.2)  mg/dL














  09/04/22 09/04/22 09/04/22 Range/Units





  05:21 05:21 07:25 


 


Seg Neuts % (Manual)  78.0 H    (40.0-70.0)  %


 


Seg Neutrophils # Man  8.3 H    (1.8-7.7)  K/mm3


 


Sodium   135 L   (137-145)  mmol/L


 


BUN   42 H   (7-17)  mg/dL


 


Creatinine   5.9 H   (0.6-1.2)  mg/dL


 


Glucose   225 H   ()  mg/dL


 


POC Glucose    254 H  ()  mg/dL


 


Calcium   8.2 L   (8.4-10.2)  mg/dL

## 2022-09-05 LAB
BUN SERPL-MCNC: 49 MG/DL (ref 7–17)
BUN/CREAT SERPL: 7 %
CALCIUM SERPL-MCNC: 8 MG/DL (ref 8.4–10.2)
HEMOLYSIS INDEX: 10

## 2022-09-05 PROCEDURE — 5A1D70Z PERFORMANCE OF URINARY FILTRATION, INTERMITTENT, LESS THAN 6 HOURS PER DAY: ICD-10-PCS | Performed by: INTERNAL MEDICINE

## 2022-09-05 RX ADMIN — INSULIN LISPRO SCH UNIT: 100 INJECTION, SOLUTION INTRAVENOUS; SUBCUTANEOUS at 07:30

## 2022-09-05 RX ADMIN — INSULIN LISPRO SCH UNIT: 100 INJECTION, SOLUTION INTRAVENOUS; SUBCUTANEOUS at 16:30

## 2022-09-05 RX ADMIN — Medication SCH ML: at 09:19

## 2022-09-05 RX ADMIN — INSULIN LISPRO SCH: 100 INJECTION, SOLUTION INTRAVENOUS; SUBCUTANEOUS at 11:51

## 2022-09-05 RX ADMIN — Medication SCH ML: at 21:08

## 2022-09-05 RX ADMIN — ONDANSETRON PRN MG: 2 INJECTION INTRAMUSCULAR; INTRAVENOUS at 10:59

## 2022-09-05 NOTE — ELECTROCARDIOGRAPH REPORT
[facility]

                                       

Test Date:    2022               Test Time:    02:22:43

Pat Name:     JOCELYNE GIBBS                  Department:   

Patient ID:   [id number]          Room:         A381 1

Gender:       F                        Technician:   CATRINA

:          1970               Requested By: MINNA COVINGTON

Order Number: [id number]             Reading MD:   Kita Foster

                                 Measurements

Intervals                              Axis          

Rate:         75                       P:            7

KS:           160                      QRS:          -3

QRSD:         78                       T:            36

QT:           437                                    

QTc:          489                                    

                           Interpretive Statements

Sinus rhythm

Poor R wave progression

No previous ECG available for comparison

Electronically Signed On 2022 16:46:50 EDT by Kita Foster

## 2022-09-05 NOTE — PROGRESS NOTE
Assessment and Plan


Assessment and plan: 





#End-stage renal disease requiring hemodialysis


-permacath placed on 09/03


-CM consult for outside HD chair


-avoid nephrotoxins and renally dose medications


-Nephrology following, assistance appreciated





#Hypertension


-not controlled


-continue coreg and amlodipine scheduled; prn hydralazine ordered


-will adjust medications as needed


-goal SBP<140





#Type II diabetes mellitus with hyperglycemia


-glucose not controlled


-patient takes 70/30 20U BID at home


-will resume 10UBID since patient does has not been eating adequately due to N/V


-continue accucheks and SSI


-glucose 140-180 goal while inpatient





#Advanced care planning


-Disease education conducted, care plan discussed, diagnoses discussed, pro

gnosis discussed, and patient acknowledges understanding with care plan


-Time: +30 min











History


Interval history: 





No acute events overnight. Patient seen during HD. She had one episode of 

vomiting and reports lower back pain. Per RN at bedside patient to receive 

zofran but has had an uneventful session so far. 





Hospitalist Physical





- Physical exam


Narrative exam: 





GENERAL: Well-developed well-nourished. In no acute distress.


HEENT: Normocephalic. Atraumatic. 


NECK: Right IJ permacath in place.


CHEST/LUNGS: CTAB on room air


HEART/CARDIOVASCULAR: RRR. No murmur, rubs or gallops appreciated.


ABDOMEN: +BS. NT/ND.


SKIN: No rashes noted.


NEURO:  No focal motor deficit.  Follows all commands.


MUSCULOSKELETAL: No joint effusion 


EXTREMITIES: No cyanosis, clubbing or edema.


PSYCH: Cooperative.





- Constitutional


Vitals: 


                                        











Temp Pulse Resp BP Pulse Ox


 


 98.1 F   83   18   152/70   96 


 


 09/05/22 06:28  09/05/22 06:28  09/05/22 06:28  09/05/22 06:28  09/05/22 06:28











General appearance: Present: no acute distress





Results





- Labs


CBC & Chem 7: 


                                 09/04/22 05:21





                                 09/05/22 05:58


Labs: 


                             Laboratory Last Values











WBC  10.6 K/mm3 (4.5-11.0)   09/04/22  05:21    


 


RBC  4.04 M/mm3 (3.65-5.03)   09/04/22  05:21    


 


Hgb  11.3 gm/dl (10.1-14.3)   09/04/22  05:21    


 


Hct  34.0 % (30.3-42.9)   09/04/22  05:21    


 


MCV  84 fl (79-97)   09/04/22  05:21    


 


MCH  28 pg (28-32)   09/04/22  05:21    


 


MCHC  33 % (30-34)   09/04/22  05:21    


 


RDW  14.0 % (13.2-15.2)   09/04/22  05:21    


 


Plt Count  206 K/mm3 (140-440)   09/04/22  05:21    


 


Lymph % (Auto)  11.9 % (13.4-35.0)  L  09/02/22  13:27    


 


Mono % (Auto)  3.5 % (0.0-7.3)   09/02/22  13:27    


 


Eos % (Auto)  4.9 % (0.0-4.3)  H  09/02/22  13:27    


 


Baso % (Auto)  0.6 % (0.0-1.8)   09/02/22  13:27    


 


Lymph # (Auto)  1.1 K/mm3 (1.2-5.4)  L  09/02/22  13:27    


 


Mono # (Auto)  0.3 K/mm3 (0.0-0.8)   09/02/22  13:27    


 


Eos # (Auto)  0.4 K/mm3 (0.0-0.4)   09/02/22  13:27    


 


Baso # (Auto)  0.1 K/mm3 (0.0-0.1)   09/02/22  13:27    


 


Add Manual Diff  Complete   09/04/22  05:21    


 


Total Counted  100   09/04/22  05:21    


 


Seg Neutrophils %  79.1 % (40.0-70.0)  H  09/02/22  13:27    


 


Seg Neuts % (Manual)  78.0 % (40.0-70.0)  H  09/04/22  05:21    


 


Band Neutrophils %  1.0 %  09/04/22  05:21    


 


Lymphocytes % (Manual)  17.0 % (13.4-35.0)   09/04/22  05:21    


 


Reactive Lymphs % (Man)  0 %  09/04/22  05:21    


 


Monocytes % (Manual)  2.0 % (0.0-7.3)   09/04/22  05:21    


 


Eosinophils % (Manual)  2.0 % (0.0-4.3)   09/04/22  05:21    


 


Basophils % (Manual)  0 % (0.0-1.8)   09/04/22  05:21    


 


Metamyelocytes %  0 %  09/04/22  05:21    


 


Myelocytes %  0 %  09/04/22  05:21    


 


Promyelocytes %  0 %  09/04/22  05:21    


 


Blast Cells %  0 %  09/04/22  05:21    


 


Nucleated RBC %  Not Reportable   09/04/22  05:21    


 


Seg Neutrophils #  7.1 K/mm3 (1.8-7.7)   09/02/22  13:27    


 


Seg Neutrophils # Man  8.3 K/mm3 (1.8-7.7)  H  09/04/22  05:21    


 


Band Neutrophils #  0.1 K/mm3  09/04/22  05:21    


 


Lymphocytes # (Manual)  1.8 K/mm3 (1.2-5.4)   09/04/22  05:21    


 


Abs React Lymphs (Man)  0.0 K/mm3  09/04/22  05:21    


 


Monocytes # (Manual)  0.2 K/mm3 (0.0-0.8)   09/04/22  05:21    


 


Eosinophils # (Manual)  0.2 K/mm3 (0.0-0.4)   09/04/22  05:21    


 


Basophils # (Manual)  0.0 K/mm3 (0.0-0.1)   09/04/22  05:21    


 


Metamyelocytes #  0.0 K/mm3  09/04/22  05:21    


 


Myelocytes #  0.0 K/mm3  09/04/22  05:21    


 


Promyelocytes #  0.0 K/mm3  09/04/22  05:21    


 


Blast Cells #  0.0 K/mm3  09/04/22  05:21    


 


WBC Morphology  Not Reportable   09/04/22  05:21    


 


Hypersegmented Neuts  Not Reportable   09/04/22  05:21    


 


Hyposegmented Neuts  Not Reportable   09/04/22  05:21    


 


Hypogranular Neuts  Not Reportable   09/04/22  05:21    


 


Smudge Cells  Not Reportable   09/04/22  05:21    


 


Toxic Granulation  Not Reportable   09/04/22  05:21    


 


Toxic Vacuolation  Not Reportable   09/04/22  05:21    


 


Dohle Bodies  Not Reportable   09/04/22  05:21    


 


Pelger-Huet Anomaly  Not Reportable   09/04/22  05:21    


 


Rani Rods  Not Reportable   09/04/22  05:21    


 


Platelet Estimate  Consistent w auto   09/04/22  05:21    


 


Clumped Platelets  Not Reportable   09/04/22  05:21    


 


Plt Clumps, EDTA  Not Reportable   09/04/22  05:21    


 


Large Platelets  Not Reportable   09/04/22  05:21    


 


Giant Platelets  Not Reportable   09/04/22  05:21    


 


Platelet Satelliting  Not Reportable   09/04/22  05:21    


 


Plt Morphology Comment  Not Reportable   09/04/22  05:21    


 


RBC Morphology  Not Reportable   09/04/22  05:21    


 


Dimorphic RBCs  Not Reportable   09/04/22  05:21    


 


Polychromasia  Not Reportable   09/04/22  05:21    


 


Hypochromasia  Not Reportable   09/04/22  05:21    


 


Poikilocytosis  Not Reportable   09/04/22  05:21    


 


Anisocytosis  1+   09/04/22  05:21    


 


Microcytosis  Not Reportable   09/04/22  05:21    


 


Macrocytosis  Not Reportable   09/04/22  05:21    


 


Spherocytes  Not Reportable   09/04/22  05:21    


 


Pappenheimer Bodies  Not Reportable   09/04/22  05:21    


 


Sickle Cells  Not Reportable   09/04/22  05:21    


 


Target Cells  Not Reportable   09/04/22  05:21    


 


Tear Drop Cells  Not Reportable   09/04/22  05:21    


 


Ovalocytes  Not Reportable   09/04/22  05:21    


 


Helmet Cells  Not Reportable   09/04/22  05:21    


 


[place] Bodies  Not Reportable   09/04/22  05:21    


 


Cabot Rings  Not Reportable   09/04/22  05:21    


 


[place] Cells  Not Reportable   09/04/22  05:21    


 


Bite Cells  Not Reportable   09/04/22  05:21    


 


Crenated Cell  Not Reportable   09/04/22  05:21    


 


Elliptocytes  Not Reportable   09/04/22  05:21    


 


Acanthocytes (Spur)  Not Reportable   09/04/22  05:21    


 


Rouleaux  Not Reportable   09/04/22  05:21    


 


Hemoglobin C Crystals  Not Reportable   09/04/22  05:21    


 


Schistocytes  Not Reportable   09/04/22  05:21    


 


Malaria parasites  Not Reportable   09/04/22  05:21    


 


Memo Bodies  Not Reportable   09/04/22  05:21    


 


Hem Pathologist Commnt  No   09/04/22  05:21    


 


Sodium  131 mmol/L (137-145)  L  09/05/22  05:58    


 


Potassium  4.1 mmol/L (3.6-5.0)   09/05/22  05:58    


 


Chloride  96.8 mmol/L ()  L  09/05/22  05:58    


 


Carbon Dioxide  22 mmol/L (22-30)   09/05/22  05:58    


 


Anion Gap  16 mmol/L  09/05/22  05:58    


 


BUN  49 mg/dL (7-17)  H  09/05/22  05:58    


 


Creatinine  6.8 mg/dL (0.6-1.2)  H  09/05/22  05:58    


 


Estimated GFR  6 ml/min  09/05/22  05:58    


 


BUN/Creatinine Ratio  7 %  09/05/22  05:58    


 


Glucose  235 mg/dL ()  H  09/05/22  05:58    


 


POC Glucose  229 mg/dL ()  H  09/04/22  20:44    


 


Calcium  8.0 mg/dL (8.4-10.2)  L  09/05/22  05:58    


 


Phosphorus  5.10 mg/dL (2.5-4.5)  H  09/05/22  05:58    


 


Total Bilirubin  0.30 mg/dL (0.1-1.2)   09/02/22  13:27    


 


AST  17 units/L (5-40)   09/02/22  13:27    


 


ALT  6 units/L (7-56)  L  09/02/22  13:27    


 


Alkaline Phosphatase  131 units/L ()  H  09/02/22  13:27    


 


Total Protein  6.7 g/dL (6.3-8.2)   09/02/22  13:27    


 


Albumin  3.8 g/dL (3.9-5)  L  09/02/22  13:27    


 


Albumin/Globulin Ratio  1.3 %  09/02/22  13:27    


 


Urine Color  Yellow  (Yellow)   09/03/22  00:12    


 


Urine Turbidity  Clear  (Clear)   09/03/22  00:12    


 


Specific Gravity (Man)  1.015  (1.003-1.030)   09/03/22  00:12    


 


Ur Protein (Man)  3+ mg/dL (Negative)   09/03/22  00:12    


 


Ur Ketones (Man)  Negative  (Negative)   09/03/22  00:12    


 


Ur Nitrite (Man)  Negative  (Negative)   09/03/22  00:12    


 


Leukocyte Esterase (Man)  Negative  (Negative)   09/03/22  00:12    


 


Urine WBC (Auto)  < 1.0 /HPF (0.0-6.0)   09/03/22  00:12    


 


Urine RBC (Auto)  < 1.0 /HPF (0.0-6.0)   09/03/22  00:12    


 


U Epithel Cells (Auto)  < 1.0 /HPF (0-13.0)   09/03/22  00:12    


 


Urine RBC (Manual)  Negative  (Negative)   09/03/22  00:12    


 


Hepatitis A IgM Ab  Non-reactive  (NonReactive)   09/03/22  03:24    


 


Hep Bs Antigen  Non-reactive  (Negative)   09/03/22  03:24    


 


Hep B Core IgM Ab  Non-reactive  (NonReactive)   09/03/22  03:24    


 


Hepatitis C Antibody  Non-reactive  (NonReactive)   09/03/22  03:24    











Wolfe/IV: 


                                        





Voiding Method                   Toilet











Active Medications





- Current Medications


Current Medications: 














Generic Name Dose Route Start Last Admin





  Trade Name Freq  PRN Reason Stop Dose Admin


 


Acetaminophen  650 mg  09/03/22 04:24  09/04/22 09:01





  Acetaminophen 325 Mg Tab  PO   650 mg





  Q4H PRN   Administration





  Pain MILD(1-3)/Fever >100.5/HA  


 


Amlodipine Besylate  10 mg  09/04/22 12:00  09/04/22 12:21





  Amlodipine 10 Mg Tab  PO   10 mg





  QDAY MARJ   Administration


 


Carvedilol  12.5 mg  09/04/22 11:00  09/04/22 22:27





  Carvedilol 12.5 Mg Tab  PO   12.5 mg





  BID MARJ   Administration


 


Dextrose  0 ml  09/03/22 04:24 





  Dextrose 50% In Water (25gm) 50 Ml Syringe  IV  





  Q30MIN PRN  





  Hypoglycemia  





  Protocol  


 


Ergocalciferol  50,000 unit  09/11/22 10:00 





  Ergocalciferol (Vit D2) 50,000 Unit Cap  PO  





  [place] MARJ  


 


Heparin Sodium (Porcine)  3,000 unit  09/03/22 08:00 





  Heparin 10,000 Units/10 Ml Vial  IV  





  ANANT PRN  





  hemodialysis  


 


Hydralazine HCl  10 mg  09/03/22 06:28  09/04/22 06:11





  Hydralazine 20 Mg/1 Ml Inj  IV   10 mg





  Q4H PRN   Administration





  Hypertension  


 


Sodium Chloride  100 mls @ 999 mls/hr  09/03/22 08:00 





  Nacl 0.9%  IV  





  ANANT PRN  





  Hypotension  


 


Insulin Human Lispro  0 unit  09/03/22 07:30  09/04/22 22:34





  Insulin Lispro 100 Unit/Ml  SUB-Q   3 unit





  ACHS MARJ   Administration





  Protocol  


 


Magnesium Hydroxide  30 ml  09/03/22 04:24 





  Magnesium Hydroxide (Mom) Oral Liqd Udc  PO  





  Q4H PRN  





  Constipation  


 


Morphine Sulfate  2 mg  09/03/22 04:24 





  Morphine 2 Mg/1 Ml Inj  IV  





  Q4H PRN  





  Pain, Moderate (4-6)  


 


Morphine Sulfate  4 mg  09/03/22 04:24 





  Morphine 4 Mg/1 Ml Inj  IV  





  Q4H PRN  





  Pain , Severe (7-10)  


 


Ondansetron HCl  4 mg  09/03/22 06:30  09/04/22 08:52





  Ondansetron 4 Mg/2 Ml Inj  IV   4 mg





  Q6H PRN   Administration





  Nausea And Vomiting  


 


Sodium Chloride  10 ml  09/03/22 10:00  09/04/22 22:34





  Sodium Chloride 0.9% 10 Ml Flush Syringe  IV   10 ml





  BID MARJ   Administration


 


Sodium Chloride  10 ml  09/03/22 04:24 





  Sodium Chloride 0.9% 10 Ml Flush Syringe  IV  





  PRN PRN  





  LINE FLUSH  














Nutrition/Malnutrition Assess





- Dietary Evaluation


Nutrition/Malnutrition Findings: 


                                        





Nutrition Notes                                            Start:  09/03/22 

08:59


Freq:                                                      Status: Active       




Protocol:                                                                       




 Document     09/03/22 08:59  REGIS  (Rec: 09/03/22 09:10  REGIS  [id number])


 Nutrition Notes


     Need for Assessment generated from:         MD Order,Education


     Initial or Follow up                        Brief Note


     Current Diagnosis                           CKD (stage V CKD),Diabetes,


                                                 Hypertension


     Other Pertinent Diagnosis                   Hyperglycemia, ESRD (Need HD).


     Current Diet                                NPO (since 09/03 07:09).


     Height                                      5 ft


     Weight                                      73.02 kg


     Ideal Body Weight (kg)                      45.45


     BMI                                         31.4


     Intake Prior to Admission                   Good


     Weight change and time frame                Pt denies having loss body


                                                 weight PTA.


     Weight Status                               Obese


     Subjective/Other Information                RD consult for nutrition


                                                 education assessment.


                                                 Pt currently on NPO.


                                                 Pt is on Room Air, O2


                                                 saturation @ 99%, according to


                                                 Physical Assessment History


                                                 notes.


                                                 Pt complains of N/V, according


                                                 to Physical Assessment


                                                 History notes.


                                                 Procedure on 09/03: VasCath


                                                 Insertion, due to need for HD,


                                                 according to Surgery


                                                 Operative notes.


                                                 Pt still in critical condition


                                                 , not a candidate for


                                                 Nutrition Education at the


                                                 time, will assess feasibility


                                                 on F/U.


     Percent of energy/protein needs met:        Pt currently on NPO.


 Nutrition Intervention


     Follow-Up By:                               09/05/22


     Additional Comments                         Nutrition education will be


                                                 provided at F/U, if feasible.


                                                 When pertinent, start


                                                 monitoring food tolerance, %PO


                                                 intake of meals, and BM.

## 2022-09-05 NOTE — PROGRESS NOTE
Assessment and Plan


1. ESRD:


Patient with known h/o CKD-5.


Progressed to ESRD and presented with uremia.


Patient started on hemodialysis due to worsening renal function and associated 

hyperkalemia, metabolic acidosis and uremia.


Hemodialysis: 9/3, 9/5.


 


2. FEN:


Hyperkalemia, improved with HD.


Metabolic acidosis, improved with HD.


Monitor lytes and volume status.





3. Hypertension:


Adjust meds as needed.


Volume control.


Monitor BP.





4. DM-2:


SSI.





Need outpatient HD chair.











Subjective:


Patient was seen and examined at the bedside.











Examination:


General appearance: well-developed, well nourished, appears stated age, not in 

distress


HEENT: no icterus


Neck: trachea midline


Respiratory: ctab


Heart: S1S2, regular, no murmur


Abdomen: soft, bowel sounds heard, NT, no palpable mass


Integumentary: no obvious rash


Neurologic: AO, conversing, able to move extremities


Ext: no edema


Hemodialysis access: R IJ non-tunnel catheter








Subjective


Date of service: 09/05/22





Objective





- Vital Signs


Vital signs: 


                               Vital Signs - 12hr











  09/04/22 09/04/22 09/04/22





  22:25 22:48 22:49


 


Temperature  98.0 F 98.0 F


 


Pulse Rate 88 86 84


 


Respiratory 20 18 18





Rate   


 


Blood Pressure 137/62 110/76 


 


Blood Pressure   





[Left]   


 


O2 Sat by Pulse 96 99 99





Oximetry   














  09/05/22 09/05/22





  00:31 06:28


 


Temperature  98.1 F


 


Pulse Rate  83


 


Respiratory  18





Rate  


 


Blood Pressure  


 


Blood Pressure  152/70





[Left]  


 


O2 Sat by Pulse 98 96





Oximetry  














- Lab





                                 09/04/22 05:21





                                 09/05/22 05:58


                             Most recent lab results











Calcium  8.0 mg/dL (8.4-10.2)  L  09/05/22  05:58    


 


Phosphorus  5.10 mg/dL (2.5-4.5)  H  09/05/22  05:58    














Medications & Allergies





- Medications


Allergies/Adverse Reactions: 


                                    Allergies





No Known Allergies Allergy (Verified 03/02/22 15:12)


   








Home Medications: 


                                Home Medications











 Medication  Instructions  Recorded  Confirmed  Last Taken  Type


 


Ergocalciferol(Vitamin D2)(Nf) 50 mcg PO 1XW 03/02/22 09/03/22 08/28/22 History





[Vitamin D (Nf)]     


 


Insulin Aspart Protam & Aspart 20 unit SQ BID 03/02/22 09/03/22 1 Day Ago 

History





[NovoLOG Mix 70-30 Flexpen]    ~09/02/22 


 


carvediloL [Coreg] 25 mg PO BID 03/02/22 09/03/22 1 Day Ago History





    ~09/02/22 


 


oxyCODONE /ACETAMINOPHEN [Percocet 1 tab PO Q6HR PRN 03/02/22 09/03/22 Unknown 

History





5/325]     











Active Medications: 














Generic Name Dose Route Start Last Admin





  Trade Name Freq  PRN Reason Stop Dose Admin


 


Acetaminophen  650 mg  09/03/22 04:24  09/04/22 09:01





  Acetaminophen 325 Mg Tab  PO   650 mg





  Q4H PRN   Administration





  Pain MILD(1-3)/Fever >100.5/HA  


 


Amlodipine Besylate  10 mg  09/04/22 12:00  09/05/22 09:15





  Amlodipine 10 Mg Tab  PO   10 mg





  QDAY MARJ   Administration


 


Carvedilol  12.5 mg  09/04/22 11:00  09/05/22 09:14





  Carvedilol 12.5 Mg Tab  PO   12.5 mg





  BID MARJ   Administration


 


Dextrose  0 ml  09/03/22 04:24 





  Dextrose 50% In Water (25gm) 50 Ml Syringe  IV  





  Q30MIN PRN  





  Hypoglycemia  





  Protocol  


 


Ergocalciferol  50,000 unit  09/11/22 10:00 





  Ergocalciferol (Vit D2) 50,000 Unit Cap  PO  





  [place] [facility]  


 


Heparin Sodium (Porcine)  3,000 unit  09/03/22 08:00 





  Heparin 10,000 Units/10 Ml Vial  IV  





  ANANT PRN  





  hemodialysis  


 


Hydralazine HCl  10 mg  09/03/22 06:28  09/04/22 06:11





  Hydralazine 20 Mg/1 Ml Inj  IV   10 mg





  Q4H PRN   Administration





  Hypertension  


 


Sodium Chloride  100 mls @ 999 mls/hr  09/03/22 08:00 





  Nacl 0.9%  IV  





  ANANT PRN  





  Hypotension  


 


Insulin Human Lispro  0 unit  09/03/22 07:30  09/05/22 07:30





  Insulin Lispro 100 Unit/Ml  SUB-Q   4 unit





  [facility] MARJ   Administration





  Protocol  


 


Magnesium Hydroxide  30 ml  09/03/22 04:24 





  Magnesium Hydroxide (Mom) Oral Liqd Udc  PO  





  Q4H PRN  





  Constipation  


 


Morphine Sulfate  2 mg  09/03/22 04:24 





  Morphine 2 Mg/1 Ml Inj  IV  





  Q4H PRN  





  Pain, Moderate (4-6)  


 


Morphine Sulfate  4 mg  09/03/22 04:24 





  Morphine 4 Mg/1 Ml Inj  IV  





  Q4H PRN  





  Pain , Severe (7-10)  


 


Ondansetron HCl  4 mg  09/03/22 06:30  09/04/22 08:52





  Ondansetron 4 Mg/2 Ml Inj  IV   4 mg





  Q6H PRN   Administration





  Nausea And Vomiting  


 


Sodium Chloride  10 ml  09/03/22 10:00  09/05/22 09:19





  Sodium Chloride 0.9% 10 Ml Flush Syringe  IV   10 ml





  BID MARJ   Administration


 


Sodium Chloride  10 ml  09/03/22 04:24 





  Sodium Chloride 0.9% 10 Ml Flush Syringe  IV  





  PRN PRN  





  LINE FLUSH

## 2022-09-06 LAB
BUN SERPL-MCNC: 30 MG/DL (ref 7–17)
BUN/CREAT SERPL: 6 %
CALCIUM SERPL-MCNC: 7.9 MG/DL (ref 8.4–10.2)
HEMOLYSIS INDEX: 3

## 2022-09-06 PROCEDURE — 5A1D70Z PERFORMANCE OF URINARY FILTRATION, INTERMITTENT, LESS THAN 6 HOURS PER DAY: ICD-10-PCS | Performed by: INTERNAL MEDICINE

## 2022-09-06 RX ADMIN — INSULIN HUMAN SCH UNITS: 100 INJECTION, SOLUTION PARENTERAL at 16:30

## 2022-09-06 RX ADMIN — Medication SCH ML: at 21:31

## 2022-09-06 RX ADMIN — Medication SCH ML: at 09:14

## 2022-09-06 RX ADMIN — INSULIN HUMAN SCH: 100 INJECTION, SOLUTION PARENTERAL at 11:30

## 2022-09-06 RX ADMIN — INSULIN HUMAN SCH UNIT: 100 INJECTION, SUSPENSION SUBCUTANEOUS at 09:12

## 2022-09-06 RX ADMIN — INSULIN LISPRO SCH: 100 INJECTION, SOLUTION INTRAVENOUS; SUBCUTANEOUS at 07:30

## 2022-09-06 RX ADMIN — INSULIN LISPRO SCH UNIT: 100 INJECTION, SOLUTION INTRAVENOUS; SUBCUTANEOUS at 00:03

## 2022-09-06 RX ADMIN — INSULIN HUMAN SCH UNIT: 100 INJECTION, SUSPENSION SUBCUTANEOUS at 17:42

## 2022-09-06 NOTE — XRAY REPORT
CHEST 1 VIEW 9/6/2022 1:38 PM



INDICATION / CLINICAL INFORMATION: Baseline.



COMPARISON: 2011



FINDINGS:



SUPPORT DEVICES: Right Vas-Cath tip overlies distal SVC

HEART / MEDIASTINUM: Cardiomegaly 

LUNGS / PLEURA: No significant pulmonary or pleural abnormality. No pneumothorax. 



ADDITIONAL FINDINGS: No significant additional findings.



IMPRESSION:

1. No acute findings.



Signer Name: El Beckwith MD 

Signed: 9/6/2022 2:05 PM

Workstation Name: [email or web address]

## 2022-09-06 NOTE — PROGRESS NOTE
Assessment and Plan


Assessment and plan: 





#End-stage renal disease requiring hemodialysis


-Vas-Cath and right IJ placed on 09/03; pending permanent AV fistula formation 

by vascular surgery on 9/7/2022


Nephrology consulted; appreciate recs


-CM consult for outside HD chair


-avoid nephrotoxins and renally dose medications





#Hypertension


-continue coreg 25 mg twice daily.  Discontinued amlodipine and starting 

nifedipine 30 mg every 12 hours; prn hydralazine ordered


-will adjust medications as needed


-goal SBP<140





#Insulin dependent type II diabetes mellitus with hyperglycemia


- hemoglobin A1c: Unknown 


- home regimen: NPH 70/30 20 units twice daily


- current regimen: NPH 70/30 10 units twice daily


- blood glucose goal 140-180 while inpatient


- continue to monitor





#Mild hyponatremia


 Sodium 133


 Improving with hemodialysis.  Likely secondary to volume overload.





#Hypocalcemia


 Calcium 7.9





#Obesity


#Weight loss counseling


#Exercise counseling


- BMI 31.4


- Counseled patient on the importance of weight loss, incorporating exercise, 

and dietary changes (lean meats, fresh fruits and vegetables, and water intake).

Patient expresses understanding. 


- Time: +15 min





#Advanced care planning


-Disease education conducted, care plan discussed, diagnoses discussed, 

prognosis discussed, and patient acknowledges understanding with care plan


-Time: +30 min





Disposition Plan:  pending hemodialysis chair


Total Time Spent with Patient (Minutes): 45 minutes





History


Interval history: 





No acute events overnight.





Hospitalist Physical





- Constitutional


Vitals: 


                                        











Temp Pulse Resp BP Pulse Ox


 


 97.8 F   85   16   186/86   97 


 


 09/06/22 12:48  09/06/22 12:48  09/06/22 12:48  09/06/22 12:48  09/06/22 12:48











General appearance: Present: no acute distress, well-nourished, obese





- EENT


Eyes: Present: PERRL, EOM intact


ENT: hearing intact, clear oral mucosa, dentition normal





- Neck


Neck: Present: supple, normal ROM, other (Vas-Cath in right IJ)





- Respiratory


Respiratory effort: normal


Respiratory: bilateral: CTA





- Cardiovascular


Rhythm: regular


Heart Sounds: Present: S1 & S2





- Extremities


Extremities: no ischemia, pulses intact, pulses symmetrical, No edema, normal 

temperature, normal color, Full ROM


Peripheral Pulses: within normal limits





- Abdominal


General gastrointestinal: soft, non-tender, non-distended, normal bowel sounds





- Integumentary


Integumentary: Present: clear, warm, dry





- Psychiatric


Psychiatric: appropriate mood/affect, intact judgment & insight, memory intact, 

cooperative





- Neurologic


Neurologic: CNII-XII intact, moves all extremities





- Allied Health


Allied health notes reviewed: nursing, social work, case management





Results





- Labs


CBC & Chem 7: 


                                 09/04/22 05:21





                                 09/06/22 09:30


Labs: 


                             Laboratory Last Values











WBC  10.6 K/mm3 (4.5-11.0)   09/04/22  05:21    


 


RBC  4.04 M/mm3 (3.65-5.03)   09/04/22  05:21    


 


Hgb  11.3 gm/dl (10.1-14.3)   09/04/22  05:21    


 


Hct  34.0 % (30.3-42.9)   09/04/22  05:21    


 


MCV  84 fl (79-97)   09/04/22  05:21    


 


MCH  28 pg (28-32)   09/04/22  05:21    


 


MCHC  33 % (30-34)   09/04/22  05:21    


 


RDW  14.0 % (13.2-15.2)   09/04/22  05:21    


 


Plt Count  206 K/mm3 (140-440)   09/04/22  05:21    


 


Lymph % (Auto)  11.9 % (13.4-35.0)  L  09/02/22  13:27    


 


Mono % (Auto)  3.5 % (0.0-7.3)   09/02/22  13:27    


 


Eos % (Auto)  4.9 % (0.0-4.3)  H  09/02/22  13:27    


 


Baso % (Auto)  0.6 % (0.0-1.8)   09/02/22  13:27    


 


Lymph # (Auto)  1.1 K/mm3 (1.2-5.4)  L  09/02/22  13:27    


 


Mono # (Auto)  0.3 K/mm3 (0.0-0.8)   09/02/22  13:27    


 


Eos # (Auto)  0.4 K/mm3 (0.0-0.4)   09/02/22  13:27    


 


Baso # (Auto)  0.1 K/mm3 (0.0-0.1)   09/02/22  13:27    


 


Add Manual Diff  Complete   09/04/22  05:21    


 


Total Counted  100   09/04/22  05:21    


 


Seg Neutrophils %  79.1 % (40.0-70.0)  H  09/02/22  13:27    


 


Seg Neuts % (Manual)  78.0 % (40.0-70.0)  H  09/04/22  05:21    


 


Band Neutrophils %  1.0 %  09/04/22  05:21    


 


Lymphocytes % (Manual)  17.0 % (13.4-35.0)   09/04/22  05:21    


 


Reactive Lymphs % (Man)  0 %  09/04/22  05:21    


 


Monocytes % (Manual)  2.0 % (0.0-7.3)   09/04/22  05:21    


 


Eosinophils % (Manual)  2.0 % (0.0-4.3)   09/04/22  05:21    


 


Basophils % (Manual)  0 % (0.0-1.8)   09/04/22  05:21    


 


Metamyelocytes %  0 %  09/04/22  05:21    


 


Myelocytes %  0 %  09/04/22  05:21    


 


Promyelocytes %  0 %  09/04/22  05:21    


 


Blast Cells %  0 %  09/04/22  05:21    


 


Nucleated RBC %  Not Reportable   09/04/22  05:21    


 


Seg Neutrophils #  7.1 K/mm3 (1.8-7.7)   09/02/22  13:27    


 


Seg Neutrophils # Man  8.3 K/mm3 (1.8-7.7)  H  09/04/22  05:21    


 


Band Neutrophils #  0.1 K/mm3  09/04/22  05:21    


 


Lymphocytes # (Manual)  1.8 K/mm3 (1.2-5.4)   09/04/22  05:21    


 


Abs React Lymphs (Man)  0.0 K/mm3  09/04/22  05:21    


 


Monocytes # (Manual)  0.2 K/mm3 (0.0-0.8)   09/04/22  05:21    


 


Eosinophils # (Manual)  0.2 K/mm3 (0.0-0.4)   09/04/22  05:21    


 


Basophils # (Manual)  0.0 K/mm3 (0.0-0.1)   09/04/22  05:21    


 


Metamyelocytes #  0.0 K/mm3  09/04/22  05:21    


 


Myelocytes #  0.0 K/mm3  09/04/22  05:21    


 


Promyelocytes #  0.0 K/mm3  09/04/22  05:21    


 


Blast Cells #  0.0 K/mm3  09/04/22  05:21    


 


WBC Morphology  Not Reportable   09/04/22  05:21    


 


Hypersegmented Neuts  Not Reportable   09/04/22  05:21    


 


Hyposegmented Neuts  Not Reportable   09/04/22  05:21    


 


Hypogranular Neuts  Not Reportable   09/04/22  05:21    


 


Smudge Cells  Not Reportable   09/04/22  05:21    


 


Toxic Granulation  Not Reportable   09/04/22  05:21    


 


Toxic Vacuolation  Not Reportable   09/04/22  05:21    


 


Dohle Bodies  Not Reportable   09/04/22  05:21    


 


Pelger-Huet Anomaly  Not Reportable   09/04/22  05:21    


 


Rani Rods  Not Reportable   09/04/22  05:21    


 


Platelet Estimate  Consistent w auto   09/04/22  05:21    


 


Clumped Platelets  Not Reportable   09/04/22  05:21    


 


Plt Clumps, EDTA  Not Reportable   09/04/22  05:21    


 


Large Platelets  Not Reportable   09/04/22  05:21    


 


Giant Platelets  Not Reportable   09/04/22  05:21    


 


Platelet Satelliting  Not Reportable   09/04/22  05:21    


 


Plt Morphology Comment  Not Reportable   09/04/22  05:21    


 


RBC Morphology  Not Reportable   09/04/22  05:21    


 


Dimorphic RBCs  Not Reportable   09/04/22  05:21    


 


Polychromasia  Not Reportable   09/04/22  05:21    


 


Hypochromasia  Not Reportable   09/04/22  05:21    


 


Poikilocytosis  Not Reportable   09/04/22  05:21    


 


Anisocytosis  1+   09/04/22  05:21    


 


Microcytosis  Not Reportable   09/04/22  05:21    


 


Macrocytosis  Not Reportable   09/04/22  05:21    


 


Spherocytes  Not Reportable   09/04/22  05:21    


 


Pappenheimer Bodies  Not Reportable   09/04/22  05:21    


 


Sickle Cells  Not Reportable   09/04/22  05:21    


 


Target Cells  Not Reportable   09/04/22  05:21    


 


Tear Drop Cells  Not Reportable   09/04/22  05:21    


 


Ovalocytes  Not Reportable   09/04/22  05:21    


 


Helmet Cells  Not Reportable   09/04/22  05:21    


 


Lovell-Goodrich Bodies  Not Reportable   09/04/22  05:21    


 


Cabot Rings  Not Reportable   09/04/22  05:21    


 


Lily Cells  Not Reportable   09/04/22  05:21    


 


Bite Cells  Not Reportable   09/04/22  05:21    


 


Crenated Cell  Not Reportable   09/04/22  05:21    


 


Elliptocytes  Not Reportable   09/04/22  05:21    


 


Acanthocytes (Spur)  Not Reportable   09/04/22  05:21    


 


Rouleaux  Not Reportable   09/04/22  05:21    


 


Hemoglobin C Crystals  Not Reportable   09/04/22  05:21    


 


Schistocytes  Not Reportable   09/04/22  05:21    


 


Malaria parasites  Not Reportable   09/04/22  05:21    


 


Memo Bodies  Not Reportable   09/04/22  05:21    


 


Hem Pathologist Commnt  No   09/04/22  05:21    


 


Sodium  133 mmol/L (137-145)  L  09/06/22  09:30    


 


Potassium  4.0 mmol/L (3.6-5.0)   09/06/22  09:30    


 


Chloride  96.8 mmol/L ()  L  09/06/22  09:30    


 


Carbon Dioxide  27 mmol/L (22-30)   09/06/22  09:30    


 


Anion Gap  13 mmol/L  09/06/22  09:30    


 


BUN  30 mg/dL (7-17)  H  09/06/22  09:30    


 


Creatinine  5.3 mg/dL (0.6-1.2)  H  09/06/22  09:30    


 


Estimated GFR  9 ml/min  09/06/22  09:30    


 


BUN/Creatinine Ratio  6 %  09/06/22  09:30    


 


Glucose  295 mg/dL ()  H  09/06/22  09:30    


 


POC Glucose  242 mg/dL ()  H  09/06/22  07:50    


 


Calcium  7.9 mg/dL (8.4-10.2)  L  09/06/22  09:30    


 


Phosphorus  5.10 mg/dL (2.5-4.5)  H  09/05/22  05:58    


 


Total Bilirubin  0.30 mg/dL (0.1-1.2)   09/02/22  13:27    


 


AST  17 units/L (5-40)   09/02/22  13:27    


 


ALT  6 units/L (7-56)  L  09/02/22  13:27    


 


Alkaline Phosphatase  131 units/L ()  H  09/02/22  13:27    


 


Total Protein  6.7 g/dL (6.3-8.2)   09/02/22  13:27    


 


Albumin  3.8 g/dL (3.9-5)  L  09/02/22  13:27    


 


Albumin/Globulin Ratio  1.3 %  09/02/22  13:27    


 


Urine Color  Yellow  (Yellow)   09/03/22  00:12    


 


Urine Turbidity  Clear  (Clear)   09/03/22  00:12    


 


Specific Gravity (Man)  1.015  (1.003-1.030)   09/03/22  00:12    


 


Ur Protein (Man)  3+ mg/dL (Negative)   09/03/22  00:12    


 


Ur Ketones (Man)  Negative  (Negative)   09/03/22  00:12    


 


Ur Nitrite (Man)  Negative  (Negative)   09/03/22  00:12    


 


Leukocyte Esterase (Man)  Negative  (Negative)   09/03/22  00:12    


 


Urine WBC (Auto)  < 1.0 /HPF (0.0-6.0)   09/03/22  00:12    


 


Urine RBC (Auto)  < 1.0 /HPF (0.0-6.0)   09/03/22  00:12    


 


U Epithel Cells (Auto)  < 1.0 /HPF (0-13.0)   09/03/22  00:12    


 


Urine RBC (Manual)  Negative  (Negative)   09/03/22  00:12    


 


SARS-CoV-2 (PCR)  Negative  (Negative)   09/06/22  13:35    


 


Hepatitis A IgM Ab  Non-reactive  (NonReactive)   09/03/22  03:24    


 


Hep Bs Antigen  Non-reactive  (Negative)   09/03/22  03:24    


 


Hep B Core IgM Ab  Non-reactive  (NonReactive)   09/03/22  03:24    


 


Hepatitis C Antibody  Non-reactive  (NonReactive)   09/03/22  03:24    











Wolfe/IV: 


                                        





Voiding Method                   Toilet











Active Medications





- Current Medications


Current Medications: 














Generic Name Dose Route Start Last Admin





  Trade Name Freq  PRN Reason Stop Dose Admin


 


Acetaminophen  650 mg  09/03/22 04:24  09/04/22 09:01





  Acetaminophen 325 Mg Tab  PO   650 mg





  Q4H PRN   Administration





  Pain MILD(1-3)/Fever >100.5/HA  


 


Amlodipine Besylate  10 mg  09/04/22 12:00  09/06/22 13:19





  Amlodipine 10 Mg Tab  PO   10 mg





  QDAY MARJ   Administration


 


Carvedilol  12.5 mg  09/04/22 11:00  09/06/22 13:19





  Carvedilol 12.5 Mg Tab  PO   12.5 mg





  BID MARJ   Administration


 


Cefazolin Sodium  2 gm  09/07/22 08:00 





  Cefazolin/Sterile Water 2 Gm/20 Ml Syringe  IV  09/07/22 23:59 





  PREOP NR  


 


Dextrose  0 ml  09/03/22 04:24 





  Dextrose 50% In Water (25gm) 50 Ml Syringe  IV  





  Q30MIN PRN  





  Hypoglycemia  





  Protocol  


 


Ergocalciferol  50,000 unit  09/11/22 10:00 





  Ergocalciferol (Vit D2) 50,000 Unit Cap  PO  





  [facility]  


 


Heparin Sodium (Porcine)  3,000 unit  09/03/22 08:00 





  Heparin 10,000 Units/10 Ml Vial  IV  





  ANANT PRN  





  hemodialysis  


 


Hydralazine HCl  10 mg  09/03/22 06:28  09/04/22 06:11





  Hydralazine 20 Mg/1 Ml Inj  IV   10 mg





  Q4H PRN   Administration





  Hypertension  


 


Sodium Chloride  100 mls @ 999 mls/hr  09/03/22 08:00 





  Nacl 0.9%  IV  





  ANANT PRN  





  Hypotension  


 


Insulin Human Isoph/Insulin Regular  10 unit  09/06/22 09:00  09/06/22 09:12





  Insulin Nph/Regular 70/30 Inj  SUB-Q   10 unit





  BIDDIAB MARJ   Administration


 


Insulin Human Regular  0 units  09/06/22 11:30  09/06/22 11:30





  Insulin Regular, Human 100 Units/1 Ml  SUB-Q   Not Given





  [facility]  





  Protocol  


 


Magnesium Hydroxide  30 ml  09/03/22 04:24 





  Magnesium Hydroxide (Mom) Oral Liqd Udc  PO  





  Q4H PRN  





  Constipation  


 


Morphine Sulfate  2 mg  09/03/22 04:24 





  Morphine 2 Mg/1 Ml Inj  IV  





  Q4H PRN  





  Pain, Moderate (4-6)  


 


Morphine Sulfate  4 mg  09/03/22 04:24 





  Morphine 4 Mg/1 Ml Inj  IV  





  Q4H PRN  





  Pain , Severe (7-10)  


 


Ondansetron HCl  4 mg  09/03/22 06:30  09/05/22 10:59





  Ondansetron 4 Mg/2 Ml Inj  IV   4 mg





  Q6H PRN   Administration





  Nausea And Vomiting  


 


Sodium Chloride  10 ml  09/03/22 10:00  09/06/22 09:14





  Sodium Chloride 0.9% 10 Ml Flush Syringe  IV   10 ml





  BID MARJ   Administration


 


Sodium Chloride  10 ml  09/03/22 04:24 





  Sodium Chloride 0.9% 10 Ml Flush Syringe  IV  





  PRN PRN  





  LINE FLUSH  














Nutrition/Malnutrition Assess





- Dietary Evaluation


Nutrition/Malnutrition Findings: 


                                        





Nutrition Notes                                            Start:  09/03/22 

08:59


Freq:                                                      Status: Active       




Protocol:                                                                       




 Document     09/05/22 16:13  REGIS  (Rec: 09/05/22 16:29  REGIS  [id number])


 Nutrition Notes


     Initial or Follow up                        Assessment


     Current Diagnosis                           CKD (stage V CKD),Diabetes,


                                                 Hypertension


     Other Pertinent Diagnosis                   s/p Hyperglycemia, ESRD+HD.


     Current Diet                                Consistent Carbohydrates -


                                                 Renal- Diet (since L 09/03).


     Labs/Tests                                  09/05: Na 131, Cl 96.8, BUN 49


                                                 , Crea 6.8, Glu 235, Ca 8.0,


                                                 Phos 5.1.


     Pertinent Medications                       09/05: Vit D2, others


                                                 nutritionally unremarkable.


     Height                                      5 ft


     Weight                                      73.02 kg


     Ideal Body Weight (kg)                      45.45


     BMI                                         31.4


     Weight change and time frame                No body weight change reported


                                                 in 2 days.


     Weight Status                               Obese


     Subjective/Other Information                RD consult for routine F/U on


                                                 dietary advancement.


                                                 Diet advanced to PO, Pt's PO


                                                 intake of meals has been Good


                                                 (75%) and well tolerated,


                                                 according to ADL notes.


                                                 Pt is on Room Air, O2


                                                 saturation @ 98%, according to


                                                 Physical Assessment History


                                                 notes.


     Percent of energy/protein needs met:        Prescribed Consistent


                                                 Carbohydrates -Renal- Diet


                                                 provides for energy/protein


                                                 needs (2,061 Kcal/91 g) during


                                                 LOS.


     Burn                                        Absent


     Trauma                                      Absent


     GI Symptoms                                 None


     Food Allergy                                No


     Skin Integrity/Comment                      Assessment WNL.


     Current % PO                                Good (%)


     Minimum of two criteria                     No


     Fluid Accumulation                          N/A


     Reduced  Strength                       N/A (non-severe)


     Protein-Calorie Malnutrition                N\A


     #1


      Nutrition Diagnosis                        Overweight/obesity


      Etiology                                   Possibly lifestyle factors.


      As Evidenced by Signs and Symptoms         BMI: 31.4 Kg/m2.


     Is patient on ventilator?                   No


     Is Patient Ambulatory and/or Out of Bed     Yes


     REE-([place]-St. Jeor-ambulatory/OOB) [     5573.210


      [id number]]                               


     Kcal/Kg value to use for calculation        15


     Approximate Energy Requirements Using       1095


      kcal/Kg                                    


     Calculation Used for Recommendations        Kcal/kg


     Additional Notes                            Protein: >1.2 g/Kg AdjBW; >71


                                                 g/day.


                                                 Fluids: 1 ml/Kcal, or as per


                                                 MD.


 Nutrition Intervention


     Change Diet Order:                          Continue Consistent


                                                 Carbohydrates -Renal- Diet as


                                                 tolerated.


     Goal #1                                     Adjust the dietary


                                                 intervention to better serve


                                                 Pt's needs and clinical


                                                 conditions during LOS.


     Follow-Up By:                               09/12/22


     Additional Comments                         Continue monitoring food


                                                 tolerance, %PO intake of meals


                                                 , dietary supplements, and BM.

## 2022-09-06 NOTE — ANESTHESIA CONSULTATION
Anesthesia Consult and Med Hx


Date of service: 09/07/22





- Airway


Anesthetic Teeth Evaluation: Good


ROM Head & Neck: Adequate


Mental/Hyoid Distance: Adequate


Mallampati Class: Class II


Intubation Access Assessment: Probably Good





- Pulmonary Exam


CTA: Yes





- Cardiac Exam


Cardiac Exam: RRR





- Pre-Operative Health Status


ASA Pre-Surgery Classification: ASA3, ASA4


Proposed Anesthetic Plan: General, MAC


Nerve Block: IS





- Pulmonary


Hx Smoking: No


Hx Asthma: No


Hx Respiratory Symptoms: No


Hx Sleep Apnea: No





- Cardiovascular System


Hx Hypertension: Yes (took carvedilol this AM)


Hx Heart Attack/AMI: No





- Central Nervous System


Hx Neuromuscular Disorder: No


Hx Back Pain: Yes (LOW BACK PAIN)


Hx Psychiatric Problems: No





- Gastrointestinal


Hx Gastroesophageal Reflux Disease: No





- Endocrine


Hx Renal Disease: Yes (Stage V CKD)


Hx End Stage Renal Disease: Yes (Dialysis 9/6/22; K+ 4.0)


Hx Liver Disease: No


Hx Insulin Dependent Diabetes: Yes


Hx Thyroid Disease: No





- Hematic


Hx Anemia: No


Hx Sickle Cell Disease: No





- Other Systems


Hx Alcohol Use: No


Hx Substance Use: No


Hx Cancer: No


Hx Obesity: Yes (BMI 33)





- Additional Comments


Anesthesia Medical History Comments: H/o PONV. No FHAC.

## 2022-09-06 NOTE — EVENT NOTE
Date: 09/06/22





The patient has a previous creation of a right brachiocephalic arteriovenous 

fistula that failed.  She has now progressed to end-stage renal disease and is 

on hemodialysis through a right IJ Vas-Cath.  She requires long-term dialysis 

access and we will plan for placement of a right arm arteriovenous graft in the 

OR tomorrow.

## 2022-09-06 NOTE — PROGRESS NOTE
Assessment and Plan


1. ESRD:


Patient with known h/o CKD-5.


Progressed to ESRD and presented with uremia.


Patient started on hemodialysis due to worsening renal function and associated 

hyperkalemia, metabolic acidosis and uremia.


Hemodialysis: 9/3, 9/5.


HD today.


 


2. FEN:


Hyperkalemia, improved with HD.


Metabolic acidosis, improved with HD.


Monitor lytes and volume status.





3. Hypertension:


Adjust meds as needed.


Volume control.


Monitor BP.





4. DM-2:


SSI.





D/w  about placement of new AVF / AVG.


Await outpatient HD chair.











Subjective:


Patient was seen and examined at the bedside.











Examination:


General appearance: well-developed, well nourished, appears stated age, not in 

distress


HEENT: no icterus


Neck: trachea midline


Respiratory: ctab


Heart: S1S2, regular, no murmur


Abdomen: soft, bowel sounds heard, NT, no palpable mass


Integumentary: no obvious rash


Neurologic: AO, conversing, able to move extremities


Ext: no edema


Hemodialysis access: R IJ non-tunnel catheter








Subjective


Date of service: 09/06/22





Objective





- Vital Signs


Vital signs: 


                               Vital Signs - 12hr











  09/05/22 09/05/22 09/05/22





  21:07 21:44 22:00


 


Temperature  98.3 F 


 


Pulse Rate 86 85 


 


Respiratory  20 





Rate   


 


Blood Pressure 145/73 160/80 


 


O2 Sat by Pulse  95 98





Oximetry   














  09/06/22





  03:43


 


Temperature 98.1 F


 


Pulse Rate 83


 


Respiratory 18





Rate 


 


Blood Pressure 147/71


 


O2 Sat by Pulse 94





Oximetry 














- Lab





                                 09/04/22 05:21





                                 09/06/22 09:30


                             Most recent lab results











Calcium  8.0 mg/dL (8.4-10.2)  L  09/05/22  05:58    


 


Phosphorus  5.10 mg/dL (2.5-4.5)  H  09/05/22  05:58    














Medications & Allergies





- Medications


Allergies/Adverse Reactions: 


                                    Allergies





No Known Allergies Allergy (Verified 03/02/22 15:12)


   








Home Medications: 


                                Home Medications











 Medication  Instructions  Recorded  Confirmed  Last Taken  Type


 


Ergocalciferol(Vitamin D2)(Nf) 50 mcg PO 1XW 03/02/22 09/03/22 08/28/22 History





[Vitamin D (Nf)]     


 


Insulin Aspart Protam & Aspart 20 unit SQ BID 03/02/22 09/03/22 1 Day Ago 

History





[NovoLOG Mix 70-30 Flexpen]    ~09/02/22 


 


carvediloL [Coreg] 25 mg PO BID 03/02/22 09/03/22 1 Day Ago History





    ~09/02/22 


 


oxyCODONE /ACETAMINOPHEN [Percocet 1 tab PO Q6HR PRN 03/02/22 09/03/22 Unknown 

History





5/325]     











Active Medications: 














Generic Name Dose Route Start Last Admin





  Trade Name Freq  PRN Reason Stop Dose Admin


 


Acetaminophen  650 mg  09/03/22 04:24  09/04/22 09:01





  Acetaminophen 325 Mg Tab  PO   650 mg





  Q4H PRN   Administration





  Pain MILD(1-3)/Fever >100.5/HA  


 


Amlodipine Besylate  10 mg  09/04/22 12:00  09/05/22 09:15





  Amlodipine 10 Mg Tab  PO   10 mg





  QDAY MARJ   Administration


 


Carvedilol  12.5 mg  09/04/22 11:00  09/05/22 21:07





  Carvedilol 12.5 Mg Tab  PO   12.5 mg





  BID MARJ   Administration


 


Dextrose  0 ml  09/03/22 04:24 





  Dextrose 50% In Water (25gm) 50 Ml Syringe  IV  





  Q30MIN PRN  





  Hypoglycemia  





  Protocol  


 


Ergocalciferol  50,000 unit  09/11/22 10:00 





  Ergocalciferol (Vit D2) 50,000 Unit Cap  PO  





  [place] MARJ  


 


Heparin Sodium (Porcine)  3,000 unit  09/03/22 08:00 





  Heparin 10,000 Units/10 Ml Vial  IV  





  ANANT PRN  





  hemodialysis  


 


Hydralazine HCl  10 mg  09/03/22 06:28  09/04/22 06:11





  Hydralazine 20 Mg/1 Ml Inj  IV   10 mg





  Q4H PRN   Administration





  Hypertension  


 


Sodium Chloride  100 mls @ 999 mls/hr  09/03/22 08:00 





  Nacl 0.9%  IV  





  ANANT PRN  





  Hypotension  


 


Insulin Human Lispro  0 unit  09/03/22 07:30  09/06/22 00:03





  Insulin Lispro 100 Unit/Ml  SUB-Q   3 unit





  ACHS MARJ   Administration





  Protocol  


 


Magnesium Hydroxide  30 ml  09/03/22 04:24 





  Magnesium Hydroxide (Mom) Oral Liqd Udc  PO  





  Q4H PRN  





  Constipation  


 


Morphine Sulfate  2 mg  09/03/22 04:24 





  Morphine 2 Mg/1 Ml Inj  IV  





  Q4H PRN  





  Pain, Moderate (4-6)  


 


Morphine Sulfate  4 mg  09/03/22 04:24 





  Morphine 4 Mg/1 Ml Inj  IV  





  Q4H PRN  





  Pain , Severe (7-10)  


 


Ondansetron HCl  4 mg  09/03/22 06:30  09/05/22 10:59





  Ondansetron 4 Mg/2 Ml Inj  IV   4 mg





  Q6H PRN   Administration





  Nausea And Vomiting  


 


Sodium Chloride  10 ml  09/03/22 10:00  09/05/22 21:08





  Sodium Chloride 0.9% 10 Ml Flush Syringe  IV   10 ml





  BID MARJ   Administration


 


Sodium Chloride  10 ml  09/03/22 04:24 





  Sodium Chloride 0.9% 10 Ml Flush Syringe  IV  





  PRN PRN  





  LINE FLUSH

## 2022-09-07 LAB
BUN SERPL-MCNC: 20 MG/DL (ref 7–17)
BUN/CREAT SERPL: 5 %
CALCIUM SERPL-MCNC: 8.5 MG/DL (ref 8.4–10.2)
HEMOLYSIS INDEX: 6

## 2022-09-07 PROCEDURE — 03170KD BYPASS RIGHT BRACHIAL ARTERY TO UPPER ARM VEIN WITH NONAUTOLOGOUS TISSUE SUBSTITUTE, OPEN APPROACH: ICD-10-PCS | Performed by: SURGERY

## 2022-09-07 RX ADMIN — INSULIN HUMAN SCH UNITS: 100 INJECTION, SOLUTION PARENTERAL at 16:30

## 2022-09-07 RX ADMIN — INSULIN HUMAN SCH: 100 INJECTION, SOLUTION PARENTERAL at 07:27

## 2022-09-07 RX ADMIN — ACETAMINOPHEN PRN MG: 325 TABLET ORAL at 13:17

## 2022-09-07 RX ADMIN — INSULIN HUMAN SCH UNITS: 100 INJECTION, SOLUTION PARENTERAL at 07:24

## 2022-09-07 RX ADMIN — INSULIN HUMAN SCH: 100 INJECTION, SUSPENSION SUBCUTANEOUS at 08:00

## 2022-09-07 RX ADMIN — Medication SCH ML: at 16:24

## 2022-09-07 RX ADMIN — NIFEDIPINE SCH: 30 TABLET, FILM COATED, EXTENDED RELEASE ORAL at 00:22

## 2022-09-07 RX ADMIN — NIFEDIPINE SCH MG: 30 TABLET, FILM COATED, EXTENDED RELEASE ORAL at 13:16

## 2022-09-07 RX ADMIN — INSULIN HUMAN SCH: 100 INJECTION, SOLUTION PARENTERAL at 00:23

## 2022-09-07 RX ADMIN — INSULIN HUMAN SCH: 100 INJECTION, SOLUTION PARENTERAL at 12:08

## 2022-09-07 RX ADMIN — INSULIN HUMAN SCH UNIT: 100 INJECTION, SUSPENSION SUBCUTANEOUS at 16:30

## 2022-09-07 RX ADMIN — NIFEDIPINE SCH MG: 30 TABLET, FILM COATED, EXTENDED RELEASE ORAL at 21:40

## 2022-09-07 RX ADMIN — Medication SCH ML: at 21:42

## 2022-09-07 RX ADMIN — INSULIN HUMAN SCH UNITS: 100 INJECTION, SOLUTION PARENTERAL at 21:58

## 2022-09-07 NOTE — PROGRESS NOTE
Assessment and Plan


1. ESRD:


Patient with known h/o CKD-5.


Progressed to ESRD and presented with uremia.


Patient started on hemodialysis due to worsening renal function and associated 

hyperkalemia, metabolic acidosis and uremia.


Hemodialysis: 9/3, 9/5, 9/6.


 


2. FEN:


Hyperkalemia, improved with HD.


Metabolic acidosis, improved with HD.


Monitor lytes and volume status.





3. Hypertension:


Adjust meds as needed.


Volume control.


Monitor BP.





4. DM-2:


SSI.





Scheduled for AVG placement today.


Outpatient HD chair [facility].











Subjective:


Patient was seen and examined at the bedside.











Examination:


General appearance: well-developed, well nourished, appears stated age, not in 

distress


HEENT: no icterus


Neck: trachea midline


Respiratory: ctab


Heart: S1S2, regular, no murmur


Abdomen: soft, bowel sounds heard, NT, no palpable mass


Integumentary: no obvious rash


Neurologic: AO, conversing, able to move extremities


Ext: no edema


Hemodialysis access: R IJ non-tunnel catheter








Subjective


Date of service: 09/07/22





Objective





- Vital Signs


Vital signs: 


                               Vital Signs - 12hr











  09/07/22 09/07/22 09/07/22





  05:19 07:29 08:03


 


Temperature 98.5 F  98.8 F


 


Pulse Rate 85  76


 


Respiratory   18





Rate   


 


Blood Pressure 146/66  169/83


 


O2 Sat by Pulse 96 96 98





Oximetry   














  09/07/22 09/07/22 09/07/22





  08:25 08:30 08:35


 


Temperature   


 


Pulse Rate 82 87 82


 


Respiratory 20 24 14





Rate   


 


Blood Pressure 171/88 175/87 178/79


 


O2 Sat by Pulse 98 100 99





Oximetry   














  09/07/22





  08:40


 


Temperature 


 


Pulse Rate 85


 


Respiratory 17





Rate 


 


Blood Pressure 174/86


 


O2 Sat by Pulse 99





Oximetry 














- Lab





                                 09/04/22 05:21





                                 09/07/22 07:45


                             Most recent lab results











Calcium  8.5 mg/dL (8.4-10.2)   09/07/22  07:45    


 


Phosphorus  5.10 mg/dL (2.5-4.5)  H  09/05/22  05:58    














Medications & Allergies





- Medications


Allergies/Adverse Reactions: 


                                    Allergies





No Known Allergies Allergy (Verified 03/02/22 15:12)


   








Home Medications: 


                                Home Medications











 Medication  Instructions  Recorded  Confirmed  Last Taken  Type


 


Ergocalciferol(Vitamin D2)(Nf) 50 mcg PO 1XW 03/02/22 09/03/22 08/28/22 History





[Vitamin D (Nf)]     


 


Insulin Aspart Protam & Aspart 20 unit SQ BID 03/02/22 09/03/22 1 Day Ago 

History





[NovoLOG Mix 70-30 Flexpen]    ~09/02/22 


 


oxyCODONE /ACETAMINOPHEN [Percocet 1 tab PO Q6HR PRN 03/02/22 09/03/22 Unknown 

History





5/325 mg]     


 


NIFEdipine XL [Procardia Xl] 30 mg PO Q12HR #60 tablet 09/07/22  Unknown Rx


 


carvediloL [Coreg] 25 mg PO BID #60 tablet 09/07/22  Unknown Rx











Active Medications: 














Generic Name Dose Route Start Last Admin





  Trade Name Freq  PRN Reason Stop Dose Admin


 


Acetaminophen  650 mg  09/03/22 04:24  09/04/22 09:01





  Acetaminophen 325 Mg Tab  PO   650 mg





  Q4H PRN   Administration





  Pain MILD(1-3)/Fever >100.5/HA  


 


Carvedilol  25 mg  09/06/22 16:22  09/06/22 21:30





  Carvedilol 12.5 Mg Tab  PO   25 mg





  BID MARJ   Administration


 


Cefazolin Sodium  2 gm  09/07/22 08:00 





  Cefazolin/Sterile Water 2 Gm/20 Ml Syringe  IV  09/07/22 23:59 





  PREOP NR  


 


Dextrose  0 ml  09/03/22 04:24 





  Dextrose 50% In Water (25gm) 50 Ml Syringe  IV  





  Q30MIN PRN  





  Hypoglycemia  





  Protocol  


 


Ergocalciferol  50,000 unit  09/11/22 10:00 





  Ergocalciferol (Vit D2) 50,000 Unit Cap  PO  





  [place] MARJ  


 


Heparin Sodium (Porcine)  3,000 unit  09/03/22 08:00 





  Heparin 10,000 Units/10 Ml Vial  IV  





  ANANT PRN  





  hemodialysis  


 


Hydralazine HCl  10 mg  09/03/22 06:28  09/04/22 06:11





  Hydralazine 20 Mg/1 Ml Inj  IV   10 mg





  Q4H PRN   Administration





  Hypertension  


 


Sodium Chloride  100 mls @ 999 mls/hr  09/03/22 08:00 





  Nacl 0.9%  IV  





  ANANT PRN  





  Hypotension  


 


Sodium Chloride  1,000 mls @ 42 mls/hr  09/07/22 06:45  09/07/22 08:15





  Nacl 0.9% 1000 Ml  IV   42 mls/hr





  AS DIRECT MARJ   Administration


 


Insulin Human Isoph/Insulin Regular  10 unit  09/06/22 09:00  09/06/22 17:42





  Insulin Nph/Regular 70/30 Inj  SUB-Q   10 unit





  BIDDIAB MARJ   Administration


 


Insulin Human Regular  0 units  09/06/22 11:30  09/07/22 07:27





  Insulin Regular, Human 100 Units/1 Ml  SUB-Q   Not Given





  ACHS [facility]  





  Protocol  


 


Magnesium Hydroxide  30 ml  09/03/22 04:24 





  Magnesium Hydroxide (Mom) Oral Liqd Udc  PO  





  Q4H PRN  





  Constipation  


 


Morphine Sulfate  2 mg  09/03/22 04:24 





  Morphine 2 Mg/1 Ml Inj  IV  





  Q4H PRN  





  Pain, Moderate (4-6)  


 


Morphine Sulfate  4 mg  09/03/22 04:24 





  Morphine 4 Mg/1 Ml Inj  IV  





  Q4H PRN  





  Pain , Severe (7-10)  


 


Nifedipine  30 mg  09/06/22 22:00  09/07/22 00:22





  Nifedipine Xl 30 Mg Tab  PO   Not Given





  Q12HR [facility]  


 


Ondansetron HCl  4 mg  09/03/22 06:30  09/05/22 10:59





  Ondansetron 4 Mg/2 Ml Inj  IV   4 mg





  Q6H PRN   Administration





  Nausea And Vomiting  


 


Scopolamine  1 each  09/07/22 07:55  09/07/22 08:24





  Scopolamine Transdermal Patch 72 Hr  TD  09/07/22 23:59  1 each





  ONCE NR   Administration


 


Sodium Chloride  10 ml  09/03/22 10:00  09/06/22 21:31





  Sodium Chloride 0.9% 10 Ml Flush Syringe  IV   10 ml





  BID MARJ   Administration


 


Sodium Chloride  10 ml  09/03/22 04:24 





  Sodium Chloride 0.9% 10 Ml Flush Syringe  IV  09/12/22 04:23 





  PRN PRN  





  LINE FLUSH

## 2022-09-07 NOTE — PROGRESS NOTE
Assessment and Plan


Assessment and plan: 





#End-stage renal disease requiring hemodialysis


-Vas-Cath and right IJ placed on 09/03; status post permanent AV fistula 

formation by vascular surgery on 9/7/2022


Nephrology consulted; appreciate recs


- consult for outside HD chair


-avoid nephrotoxins and renally dose medications





#Hypertension


-continue coreg 25 mg twice daily.  Discontinued amlodipine and starting 

nifedipine 30 mg every 12 hours; prn hydralazine ordered


-will adjust medications as needed


-goal SBP<140





#Insulin dependent type II diabetes mellitus with hyperglycemia


- hemoglobin A1c: Unknown 


- home regimen: NPH 70/30 20 units twice daily


- current regimen: NPH 70/30 10 units twice daily


- blood glucose goal 140-180 while inpatient


- continue to monitor





#Mild hyponatremia


 Sodium 133


 Improving with hemodialysis.  Likely secondary to volume overload.





#Hypocalcemia


 Calcium 7.9





#Obesity


#Weight loss counseling


#Exercise counseling


- BMI 31.4


- Counseled patient on the importance of weight loss, incorporating exercise, 

and dietary changes (lean meats, fresh fruits and vegetables, and water intake).

Patient expresses understanding. 


- Time: +15 min





#Advanced care planning


-Disease education conducted, care plan discussed, diagnoses discussed, 

prognosis discussed, and patient acknowledges understanding with care plan


-Time: +30 min





#Discharge planning


- Patient is pending hemodialysis chair


- Case management has been made aware. 


Disposition Plan: Pending hemodialysis chair


Total Time Spent with Patient (Minutes): 30 minutes





History


Interval history: 





No acute events overnight.





Hospitalist Physical





- Constitutional


Vitals: 


                                        











Temp Pulse Resp BP Pulse Ox


 


 98 F   68   20   160/84   99 


 


 09/07/22 12:04  09/07/22 12:04  09/07/22 12:04  09/07/22 12:04  09/07/22 12:04











General appearance: Present: no acute distress, well-nourished, obese





- EENT


Eyes: Present: PERRL, EOM intact


ENT: hearing intact, clear oral mucosa, dentition normal





- Neck


Neck: Present: supple, normal ROM





- Respiratory


Respiratory effort: normal


Respiratory: bilateral: CTA





- Cardiovascular


Rhythm: regular


Heart Sounds: Present: S1 & S2





- Extremities


Extremities: no ischemia, pulses intact, pulses symmetrical, No edema, normal 

temperature, normal color, Full ROM


Peripheral Pulses: within normal limits





- Abdominal


General gastrointestinal: soft, non-tender, non-distended, normal bowel sounds





- Integumentary


Integumentary: Present: clear, warm, dry





- Psychiatric


Psychiatric: appropriate mood/affect, memory intact, cooperative





- Neurologic


Neurologic: CNII-XII intact, moves all extremities





- Allied Health


Allied health notes reviewed: nursing





Results





- Labs


CBC & Chem 7: 


                                 09/04/22 05:21





                                 09/07/22 07:45


Labs: 


                             Laboratory Last Values











WBC  10.6 K/mm3 (4.5-11.0)   09/04/22  05:21    


 


RBC  4.04 M/mm3 (3.65-5.03)   09/04/22  05:21    


 


Hgb  11.3 gm/dl (10.1-14.3)   09/04/22  05:21    


 


Hct  34.0 % (30.3-42.9)   09/04/22  05:21    


 


MCV  84 fl (79-97)   09/04/22  05:21    


 


MCH  28 pg (28-32)   09/04/22  05:21    


 


MCHC  33 % (30-34)   09/04/22  05:21    


 


RDW  14.0 % (13.2-15.2)   09/04/22  05:21    


 


Plt Count  206 K/mm3 (140-440)   09/04/22  05:21    


 


Lymph % (Auto)  11.9 % (13.4-35.0)  L  09/02/22  13:27    


 


Mono % (Auto)  3.5 % (0.0-7.3)   09/02/22  13:27    


 


Eos % (Auto)  4.9 % (0.0-4.3)  H  09/02/22  13:27    


 


Baso % (Auto)  0.6 % (0.0-1.8)   09/02/22  13:27    


 


Lymph # (Auto)  1.1 K/mm3 (1.2-5.4)  L  09/02/22  13:27    


 


Mono # (Auto)  0.3 K/mm3 (0.0-0.8)   09/02/22  13:27    


 


Eos # (Auto)  0.4 K/mm3 (0.0-0.4)   09/02/22  13:27    


 


Baso # (Auto)  0.1 K/mm3 (0.0-0.1)   09/02/22  13:27    


 


Add Manual Diff  Complete   09/04/22  05:21    


 


Total Counted  100   09/04/22  05:21    


 


Seg Neutrophils %  79.1 % (40.0-70.0)  H  09/02/22  13:27    


 


Seg Neuts % (Manual)  78.0 % (40.0-70.0)  H  09/04/22  05:21    


 


Band Neutrophils %  1.0 %  09/04/22  05:21    


 


Lymphocytes % (Manual)  17.0 % (13.4-35.0)   09/04/22  05:21    


 


Reactive Lymphs % (Man)  0 %  09/04/22  05:21    


 


Monocytes % (Manual)  2.0 % (0.0-7.3)   09/04/22  05:21    


 


Eosinophils % (Manual)  2.0 % (0.0-4.3)   09/04/22  05:21    


 


Basophils % (Manual)  0 % (0.0-1.8)   09/04/22  05:21    


 


Metamyelocytes %  0 %  09/04/22  05:21    


 


Myelocytes %  0 %  09/04/22  05:21    


 


Promyelocytes %  0 %  09/04/22  05:21    


 


Blast Cells %  0 %  09/04/22  05:21    


 


Nucleated RBC %  Not Reportable   09/04/22  05:21    


 


Seg Neutrophils #  7.1 K/mm3 (1.8-7.7)   09/02/22  13:27    


 


Seg Neutrophils # Man  8.3 K/mm3 (1.8-7.7)  H  09/04/22  05:21    


 


Band Neutrophils #  0.1 K/mm3  09/04/22  05:21    


 


Lymphocytes # (Manual)  1.8 K/mm3 (1.2-5.4)   09/04/22  05:21    


 


Abs React Lymphs (Man)  0.0 K/mm3  09/04/22  05:21    


 


Monocytes # (Manual)  0.2 K/mm3 (0.0-0.8)   09/04/22  05:21    


 


Eosinophils # (Manual)  0.2 K/mm3 (0.0-0.4)   09/04/22  05:21    


 


Basophils # (Manual)  0.0 K/mm3 (0.0-0.1)   09/04/22  05:21    


 


Metamyelocytes #  0.0 K/mm3  09/04/22  05:21    


 


Myelocytes #  0.0 K/mm3  09/04/22  05:21    


 


Promyelocytes #  0.0 K/mm3  09/04/22  05:21    


 


Blast Cells #  0.0 K/mm3  09/04/22  05:21    


 


WBC Morphology  Not Reportable   09/04/22  05:21    


 


Hypersegmented Neuts  Not Reportable   09/04/22  05:21    


 


Hyposegmented Neuts  Not Reportable   09/04/22  05:21    


 


Hypogranular Neuts  Not Reportable   09/04/22  05:21    


 


Smudge Cells  Not Reportable   09/04/22  05:21    


 


Toxic Granulation  Not Reportable   09/04/22  05:21    


 


Toxic Vacuolation  Not Reportable   09/04/22  05:21    


 


Dohle Bodies  Not Reportable   09/04/22  05:21    


 


Pelger-Huet Anomaly  Not Reportable   09/04/22  05:21    


 


Rani Rods  Not Reportable   09/04/22  05:21    


 


Platelet Estimate  Consistent w auto   09/04/22  05:21    


 


Clumped Platelets  Not Reportable   09/04/22  05:21    


 


Plt Clumps, EDTA  Not Reportable   09/04/22  05:21    


 


Large Platelets  Not Reportable   09/04/22  05:21    


 


Giant Platelets  Not Reportable   09/04/22  05:21    


 


Platelet Satelliting  Not Reportable   09/04/22  05:21    


 


Plt Morphology Comment  Not Reportable   09/04/22  05:21    


 


RBC Morphology  Not Reportable   09/04/22  05:21    


 


Dimorphic RBCs  Not Reportable   09/04/22  05:21    


 


Polychromasia  Not Reportable   09/04/22  05:21    


 


Hypochromasia  Not Reportable   09/04/22  05:21    


 


Poikilocytosis  Not Reportable   09/04/22  05:21    


 


Anisocytosis  1+   09/04/22  05:21    


 


Microcytosis  Not Reportable   09/04/22  05:21    


 


Macrocytosis  Not Reportable   09/04/22  05:21    


 


Spherocytes  Not Reportable   09/04/22  05:21    


 


Pappenheimer Bodies  Not Reportable   09/04/22  05:21    


 


Sickle Cells  Not Reportable   09/04/22  05:21    


 


Target Cells  Not Reportable   09/04/22  05:21    


 


Tear Drop Cells  Not Reportable   09/04/22  05:21    


 


Ovalocytes  Not Reportable   09/04/22  05:21    


 


Helmet Cells  Not Reportable   09/04/22  05:21    


 


Lovell-Titanic Bodies  Not Reportable   09/04/22  05:21    


 


Cabot Rings  Not Reportable   09/04/22  05:21    


 


Lily Cells  Not Reportable   09/04/22  05:21    


 


Bite Cells  Not Reportable   09/04/22  05:21    


 


Crenated Cell  Not Reportable   09/04/22  05:21    


 


Elliptocytes  Not Reportable   09/04/22  05:21    


 


Acanthocytes (Spur)  Not Reportable   09/04/22  05:21    


 


Rouleaux  Not Reportable   09/04/22  05:21    


 


Hemoglobin C Crystals  Not Reportable   09/04/22  05:21    


 


Schistocytes  Not Reportable   09/04/22  05:21    


 


Malaria parasites  Not Reportable   09/04/22  05:21    


 


Memo Bodies  Not Reportable   09/04/22  05:21    


 


Hem Pathologist Commnt  No   09/04/22  05:21    


 


Sodium  135 mmol/L (137-145)  L  09/07/22  07:45    


 


Potassium  4.4 mmol/L (3.6-5.0)   09/07/22  07:45    


 


Chloride  95.7 mmol/L ()  L  09/07/22  07:45    


 


Carbon Dioxide  30 mmol/L (22-30)   09/07/22  07:45    


 


Anion Gap  14 mmol/L  09/07/22  07:45    


 


BUN  20 mg/dL (7-17)  H  09/07/22  07:45    


 


Creatinine  4.4 mg/dL (0.6-1.2)  H  09/07/22  07:45    


 


Estimated GFR  11 ml/min  09/07/22  07:45    


 


BUN/Creatinine Ratio  5 %  09/07/22  07:45    


 


Glucose  221 mg/dL ()  H  09/07/22  07:45    


 


POC Glucose  197 mg/dL ()  H  09/07/22  07:50    


 


Calcium  8.5 mg/dL (8.4-10.2)   09/07/22  07:45    


 


Phosphorus  5.10 mg/dL (2.5-4.5)  H  09/05/22  05:58    


 


Total Bilirubin  0.30 mg/dL (0.1-1.2)   09/02/22  13:27    


 


AST  17 units/L (5-40)   09/02/22  13:27    


 


ALT  6 units/L (7-56)  L  09/02/22  13:27    


 


Alkaline Phosphatase  131 units/L ()  H  09/02/22  13:27    


 


Total Protein  6.7 g/dL (6.3-8.2)   09/02/22  13:27    


 


Albumin  3.8 g/dL (3.9-5)  L  09/02/22  13:27    


 


Albumin/Globulin Ratio  1.3 %  09/02/22  13:27    


 


HCG, Qual  Negative  (Negative)   09/07/22  07:45    


 


Urine Color  Yellow  (Yellow)   09/03/22  00:12    


 


Urine Turbidity  Clear  (Clear)   09/03/22  00:12    


 


Specific Gravity (Man)  1.015  (1.003-1.030)   09/03/22  00:12    


 


Ur Protein (Man)  3+ mg/dL (Negative)   09/03/22  00:12    


 


Ur Ketones (Man)  Negative  (Negative)   09/03/22  00:12    


 


Ur Nitrite (Man)  Negative  (Negative)   09/03/22  00:12    


 


Leukocyte Esterase (Man)  Negative  (Negative)   09/03/22  00:12    


 


Urine WBC (Auto)  < 1.0 /HPF (0.0-6.0)   09/03/22  00:12    


 


Urine RBC (Auto)  < 1.0 /HPF (0.0-6.0)   09/03/22  00:12    


 


U Epithel Cells (Auto)  < 1.0 /HPF (0-13.0)   09/03/22  00:12    


 


Urine RBC (Manual)  Negative  (Negative)   09/03/22  00:12    


 


SARS-CoV-2 (PCR)  Negative  (Negative)   09/06/22  13:35    


 


Hepatitis A IgM Ab  Non-reactive  (NonReactive)   09/03/22  03:24    


 


Hep Bs Antigen  Non-reactive  (Negative)   09/03/22  03:24    


 


Hep B Core IgM Ab  Non-reactive  (NonReactive)   09/03/22  03:24    


 


Hepatitis C Antibody  Non-reactive  (NonReactive)   09/03/22  03:24    











Wolfe/IV: 


                                        





Voiding Method                   Toilet











Active Medications





- Current Medications


Current Medications: 














Generic Name Dose Route Start Last Admin





  Trade Name Freq  PRN Reason Stop Dose Admin


 


Acetaminophen  650 mg  09/03/22 04:24  09/07/22 13:17





  Acetaminophen 325 Mg Tab  PO   650 mg





  Q4H PRN   Administration





  Pain MILD(1-3)/Fever >100.5/HA  


 


Carvedilol  25 mg  09/06/22 16:22  09/07/22 13:17





  Carvedilol 12.5 Mg Tab  PO   25 mg





  BID MARJ   Administration


 


Cefazolin Sodium  2 gm  09/07/22 08:00 





  Cefazolin/Sterile Water 2 Gm/20 Ml Syringe  IV  09/07/22 23:59 





  PREOP NR  


 


Dextrose  0 ml  09/03/22 04:24 





  Dextrose 50% In Water (25gm) 50 Ml Syringe  IV  





  Q30MIN PRN  





  Hypoglycemia  





  Protocol  


 


Ergocalciferol  50,000 unit  09/11/22 10:00 





  Ergocalciferol (Vit D2) 50,000 Unit Cap  PO  





  [place] MARJ  


 


Heparin Sodium (Porcine)  3,000 unit  09/03/22 08:00 





  Heparin 10,000 Units/10 Ml Vial  IV  





  ANANT PRN  





  hemodialysis  


 


Hydralazine HCl  10 mg  09/03/22 06:28  09/04/22 06:11





  Hydralazine 20 Mg/1 Ml Inj  IV   10 mg





  Q4H PRN   Administration





  Hypertension  


 


Sodium Chloride  100 mls @ 999 mls/hr  09/03/22 08:00 





  Nacl 0.9%  IV  





  ANANT PRN  





  Hypotension  


 


Sodium Chloride  1,000 mls @ 42 mls/hr  09/07/22 06:45  09/07/22 08:15





  Nacl 0.9% 1000 Ml  IV   42 mls/hr





  AS DIRECT MARJ   Administration


 


Insulin Human Isoph/Insulin Regular  10 unit  09/06/22 09:00  09/07/22 08:00





  Insulin Nph/Regular 70/30 Inj  SUB-Q   Not Given





  BIDDIAB [facility]  


 


Insulin Human Regular  0 units  09/06/22 11:30  09/07/22 12:08





  Insulin Regular, Human 100 Units/1 Ml  SUB-Q   Not Given





  [facility]  





  Protocol  


 


Magnesium Hydroxide  30 ml  09/03/22 04:24 





  Magnesium Hydroxide (Mom) Oral Liqd Udc  PO  





  Q4H PRN  





  Constipation  


 


Morphine Sulfate  2 mg  09/03/22 04:24 





  Morphine 2 Mg/1 Ml Inj  IV  





  Q4H PRN  





  Pain, Moderate (4-6)  


 


Morphine Sulfate  4 mg  09/03/22 04:24 





  Morphine 4 Mg/1 Ml Inj  IV  





  Q4H PRN  





  Pain , Severe (7-10)  


 


Nifedipine  30 mg  09/06/22 22:00  09/07/22 13:16





  Nifedipine Xl 30 Mg Tab  PO   30 mg





  Q12HR MARJ   Administration


 


Ondansetron HCl  4 mg  09/03/22 06:30  09/05/22 10:59





  Ondansetron 4 Mg/2 Ml Inj  IV   4 mg





  Q6H PRN   Administration





  Nausea And Vomiting  


 


Scopolamine  1 each  09/07/22 07:55  09/07/22 08:24





  Scopolamine Transdermal Patch 72 Hr  TD  09/07/22 23:59  1 each





  ONCE NR   Administration


 


Sodium Chloride  10 ml  09/03/22 10:00  09/06/22 21:31





  Sodium Chloride 0.9% 10 Ml Flush Syringe  IV   10 ml





  BID MARJ   Administration


 


Sodium Chloride  10 ml  09/03/22 04:24 





  Sodium Chloride 0.9% 10 Ml Flush Syringe  IV  





  PRN PRN  





  LINE FLUSH  














Nutrition/Malnutrition Assess





- Dietary Evaluation


Nutrition/Malnutrition Findings: 


                                        





Nutrition Notes                                            Start:  09/03/22 

08:59


Freq:                                                      Status: Active       




Protocol:                                                                       




 Document     09/05/22 16:13  REGIS  (Rec: 09/05/22 16:29  REGIS  [id number])


 Nutrition Notes


     Initial or Follow up                        Assessment


     Current Diagnosis                           CKD (stage V CKD),Diabetes,


                                                 Hypertension


     Other Pertinent Diagnosis                   s/p Hyperglycemia, ESRD+HD.


     Current Diet                                Consistent Carbohydrates -


                                                 Renal- Diet (since L 09/03).


     Labs/Tests                                  09/05: Na 131, Cl 96.8, BUN 49


                                                 , Crea 6.8, Glu 235, Ca 8.0,


                                                 Phos 5.1.


     Pertinent Medications                       09/05: Vit D2, others


                                                 nutritionally unremarkable.


     Height                                      5 ft


     Weight                                      73.02 kg


     Ideal Body Weight (kg)                      45.45


     BMI                                         31.4


     Weight change and time frame                No body weight change reported


                                                 in 2 days.


     Weight Status                               Obese


     Subjective/Other Information                RD consult for routine F/U on


                                                 dietary advancement.


                                                 Diet advanced to PO, Pt's PO


                                                 intake of meals has been Good


                                                 (75%) and well tolerated,


                                                 according to ADL notes.


                                                 Pt is on Room Air, O2


                                                 saturation @ 98%, according to


                                                 Physical Assessment History


                                                 notes.


     Percent of energy/protein needs met:        Prescribed Consistent


                                                 Carbohydrates -Renal- Diet


                                                 provides for energy/protein


                                                 needs (2,061 Kcal/91 g) during


                                                 LOS.


     Burn                                        Absent


     Trauma                                      Absent


     GI Symptoms                                 None


     Food Allergy                                No


     Skin Integrity/Comment                      Assessment WNL.


     Current % PO                                Good (%)


     Minimum of two criteria                     No


     Fluid Accumulation                          N/A


     Reduced  Strength                       N/A (non-severe)


     Protein-Calorie Malnutrition                N\A


     #1


      Nutrition Diagnosis                        Overweight/obesity


      Etiology                                   Possibly lifestyle factors.


      As Evidenced by Signs and Symptoms         BMI: 31.4 Kg/m2.


     Is patient on ventilator?                   No


     Is Patient Ambulatory and/or Out of Bed     Yes


     REE-([place] Jeor-ambulatory/OOB) [     1653.210


      [id number]]                               


     Kcal/Kg value to use for calculation        15


     Approximate Energy Requirements Using       1095


      kcal/Kg                                    


     Calculation Used for Recommendations        Kcal/kg


     Additional Notes                            Protein: >1.2 g/Kg AdjBW; >71


                                                 g/day.


                                                 Fluids: 1 ml/Kcal, or as per


                                                 MD.


 Nutrition Intervention


     Change Diet Order:                          Continue Consistent


                                                 Carbohydrates -Renal- Diet as


                                                 tolerated.


     Goal #1                                     Adjust the dietary


                                                 intervention to better serve


                                                 Pt's needs and clinical


                                                 conditions during LOS.


     Follow-Up By:                               09/12/22


     Additional Comments                         Continue monitoring food


                                                 tolerance, %PO intake of meals


                                                 , dietary supplements, and BM.

## 2022-09-07 NOTE — DISCHARGE SUMMARY
Providers





- Providers


Date of Admission: 


09/03/22 04:24





Date of discharge: 09/08/22


Attending physician: 


NIKI WILLIAMSON MD





                                        





09/02/22 23:21


Consult to Interventional Radiology [CONS] Routine 


   Consulting Provider: ANGELICA STEPHENS


   Reason For Exam: Hemodialysis catheter.


   Place consult to:: DR. STEPHENS


   Notified:: DR. STEPHENS





09/03/22 04:24


Consult to Dietitian/Nutrition [CONS] Routine 


   Physician Instructions: 


   Reason For Exam: 


   Reason for Consult: Diet education


Consult to Physician [CONS] Routine 


   Comment: 


   Consulting Provider: LOUISE GRACE


   Physician Instructions: 


   Reason For Exam: esrd - needing dialysis





09/03/22 04:31


Consult to Physician [CONS] Routine 


   Comment: 


   Consulting Provider: ANGELICA STEPHENS


   Physician Instructions: 


   Reason For Exam: ESRD- needding vascular access











Primary care physician: 


CODY CARRANZA








Hospitalization


Reason for admission: KENNETH on CKD stage V


Condition: Stable


Pertinent studies: 





Reviewed.


Procedures: 





Vas-Cath placement by vascular surgery; creation of left upper extremity AV 

fistula by vascular surgery


Hospital course: 





The patient is a 52-year-old female past medical history of insulin-dependent 

type 2 diabetes mellitus, hypertension, obesity, and CKD stage V who presented 

to the ED with complaints of weakness, decreased p.o. intake, malaise, and 

insomnia for the prior several days.  Patient endorsed being notified that she 

was in acute renal failure based on her outside labs, and she was instructed to 

present to the emergency department.  On presentation, the patient was found to 

be hemodynamically stable with an elevated blood pressure of 193/82.  Patient's 

labs were remarkable for sodium 129, potassium 5.3, bicarbonate 19, and 

creatinine 6.9.  The patient was noted to not have any acute EKG changes.  

Nephrology was consulted for management of acute renal failure and need for 

urgent hemodialysis.  Vascular surgery was consulted for placement of Vas-Cath 

that same morning.  Patient was initiated on hemodialysis, and she is tolerated 

the procedures well.  Patient also underwent creation of a left axillary vein AV

 graft by vascular surgery on 9/7/2022.  Patient tolerated the procedure well 

without any complications.  The patient will be undergoing hemodialysis at [facility] with a schedule of TTS at 11:30 AM.  The patient's first start date is 

9/13/2022.  Patient is medically clear for discharge.


Disposition: 30 STILL A PATIENT


Final Discharge Diagnosis (Prints w/discharge instructions): Newly diagnosed 

end-stage renal disease requiring hemodialysis, hypertension, insulin-dependent 

type 2 diabetes mellitus with hyperglycemia, mild hyponatremia, hypocalcemia, 

obesity.


Time spent for discharge: 45 min 





Core Measure Documentation





- Palliative Care


Palliative Care/ Comfort Measures: Not Applicable





- Core Measures


Any of the following diagnoses?: none





Exam





- Constitutional


Vitals: 


                                        











Temp Pulse Resp BP Pulse Ox


 


 98 F   68   20   160/84   99 


 


 09/07/22 12:04  09/07/22 12:04  09/07/22 12:04  09/07/22 12:04  09/07/22 12:04











General appearance: Present: no acute distress, well-nourished, obese





- EENT


Eyes: Present: PERRL, EOM intact


ENT: hearing intact, clear oral mucosa, dentition normal





- Neck


Neck: Present: supple, normal ROM





- Respiratory


Respiratory effort: normal


Respiratory: bilateral: CTA





- Cardiovascular


Rhythm: regular


Heart Sounds: Present: S1 & S2





- Extremities


Extremities: no ischemia, pulses intact, pulses symmetrical, No edema, normal 

temperature, abnormal (New left upper extremity AV fistula)


Extremity abnormal: erythema


Peripheral Pulses: within normal limits





- Abdominal


General gastrointestinal: Present: soft, non-tender, non-distended, normal bowel

 sounds


Female genitourinary: Present: deferred





- Rectal


Rectal Exam: deferred





- Integumentary


Integumentary: Present: clear, warm, dry





- Musculoskeletal


Musculoskeletal: strength equal bilaterally





- Psychiatric


Psychiatric: appropriate mood/affect, memory intact, cooperative





- Neurologic


Neurologic: CNII-XII intact, moves all extremities





- Allied Health


Allied health notes reviewed: nursing





Plan


Activity: no restrictions


Diet: diabetic, renal


Additional Instructions: The patient is a 52-year-old female past medical 

history of insulin-dependent type 2 diabetes mellitus, hypertension, obesity, 

and CKD stage V who presented to the ED with complaints of weakness, decreased 

p.o. intake, malaise, and insomnia for the prior several days.  Patient endorsed

 being notified that she was in acute renal failure based on her outside labs, 

and she was instructed to present to the emergency department.  On presentation,

 the patient was found to be hemodynamically stable with an elevated blood 

pressure of 193/82.  Patient's labs were remarkable for sodium 129, potassium 

5.3, bicarbonate 19, and creatinine 6.9.  The patient was noted to not have any 

acute EKG changes.  Nephrology was consulted for management of acute renal 

failure and need for urgent hemodialysis.  Vascular surgery was consulted for 

placement of Vas-Cath that same morning.  Patient was initiated on hemodialysis,

 and she is tolerated the procedures well.  Patient also underwent creation of a

 left axillary vein AV graft by vascular surgery on 9/7/2022.  Patient tolerated

 the procedure well without any complications.  The patient will be undergoing 

hemodialysis at [facility] with a schedule of TTS at 11:30 AM.  The patient's 

first start date is 9/13/2022.  Patient is medically clear for discharge.


Care Plan Goals: 


Patient is medically clear for discharge.


Assessment: 


The patient is a 52-year-old female past medical history of insulin-dependent 

type 2 diabetes mellitus, hypertension, obesity, and CKD stage V who presented 

to the ED with complaints of weakness, decreased p.o. intake, malaise, and 

insomnia for the prior several days.  Patient endorsed being notified that she 

was in acute renal failure based on her outside labs, and she was instructed to 

present to the emergency department.  On presentation, the patient was found to 

be hemodynamically stable with an elevated blood pressure of 193/82.  Patient's 

labs were remarkable for sodium 129, potassium 5.3, bicarbonate 19, and 

creatinine 6.9.  The patient was noted to not have any acute EKG changes.  

Nephrology was consulted for management of acute renal failure and need for 

urgent hemodialysis.  Vascular surgery was consulted for placement of Vas-Cath 

that same morning.  Patient was initiated on hemodialysis, and she is tolerated 

the procedures well.  Patient also underwent creation of a left axillary vein AV

 graft by vascular surgery on 9/7/2022.  Patient tolerated the procedure well 

without any complications.  The patient will be undergoing hemodialysis at [facility] with a schedule of TTS at 11:30 AM.  The patient's first start date is 

9/13/2022.  Patient is medically clear for discharge.


Follow up with: 


CODY CARRANZA MD [Primary Care Provider] - 7 Days


JACKI ELLIS MD [Staff Physician] - 7 Days


Forms:  Work/School Release Form


Prescriptions: 


carvediloL [Coreg] 25 mg PO BID #60 tablet


NIFEdipine XL [Procardia Xl] 30 mg PO Q12HR #60 tablet

## 2022-09-07 NOTE — OPERATIVE REPORT
Operative Report


Operative Report: 





Date of procedure: 9/7/2022





Pre-operative diagnosis: End-Stage Renal Disease





Post-operative diagnosis: Same





Procedure(s):


1.  Creation of Right Brachial Artery to Left Axillary Vein AV Graft with 5 mm 

Bovine Graft Artergraft





Surgeon: Ramon Olivier MD





Assistant: None





Anesthesia: Regional/MAC





EBL: Minimal





Counts: Correct





Complications: None





Condition: Stable





Findings: Successful Creation of Right Arm AV Graft with Palpable Thrill and 

Palpable Radial Pulse at the Completion of the Case.





Specimen: None





Indication:





The patient is a 52-year-old female with a history of chronic renal failure who 

had a pervious creation of a right brachiocephalic AV Fistula that failed.  She 

has now progressed to ESRD and is on hemodialysis through a Vascath.  She 

requires creation of permanent dialysis access and is a candidate for an AV 

Graft.  She was given the risk, benefits, and alternative procedures and 

consented to the procedure.





Description of Procedure:  





Prior to being transported to the operating room the patient had a regional 

block of the left arm performed.  After the block was performed the patient was 

transported to the operating room and adequately sedated.  The patient's right 

arm was then prepped and draped in normal sterile fashion.  A longitudinal 

incision was made on the medial aspect of the arm just proximal to the 

antecubital crease and carried down to the brachial artery using sharp 

dissection.  The brachial artery was dissected out circumferentially both 

proximally and distally and controlled with vessel loops.  A second incision was

created in longitudinal fashion on the medial aspect of the arm just distal to 

the axillary crease and carried down to the axillary vein using sharp 

dissection.  Axillary vein was dissected out circumferentially and controlled 

with a vessel loop.  I then used a Rylee-Wick tunneler to tunnel from the 

brachial artery incision to the axillary vein incision and then pulled an 5 mm 

bovine through the tunnel.  I infused with heparinized saline to ensure that it 

was not twisted or kinked.  I put the brachial artery vessel loops on tension 

controlling the flow and then created an arteriotomy using an 11 blade and Rodriguez

scissors.  I beveled the graft and created an end-to-side anastomosis using 6-0 

Prolene running fashion.  I clamped the graft just proximal to the anastomosis 

and then released the vessel loops restoring flow in the brachial artery.  I 

placed quick clot in incision to achieve hemostasis.  I cut the proximal end of 

the graft to the appropriate length and beveled the graft in preparation for a 

venous anastomosis.  I controlled the axillary vein a Satinsky clamp and created

a venotomy using an 11 blade and Rodriguez scissors.  I created an end to side 

anastomosis using a 6-0 Prolene in running fashion.  Prior to completing the 

anastomosis I flushed the graft to ensure there was no thrombus and then 

completed the anastamosis.  I released all clamps allowing flow into the AV 

graft which had an excellent thrill.  I packed the wound with quick clot to 

achieve hemostasis.  I closed both wounds in 2 layers using 3-0 Vicryl in 

running fashion in the deep dermal layer and 4-0 Monocryl in running fashion the

subcuticular layer.  I dressed both wounds with Dermabond.  The patient 

tolerated the procedure well all sponge, needle, and instrument counts were 

correct.  The patient was taken to recovery in stable condition.

## 2022-09-08 PROCEDURE — 5A1D70Z PERFORMANCE OF URINARY FILTRATION, INTERMITTENT, LESS THAN 6 HOURS PER DAY: ICD-10-PCS | Performed by: INTERNAL MEDICINE

## 2022-09-08 RX ADMIN — ACETAMINOPHEN PRN MG: 325 TABLET ORAL at 08:23

## 2022-09-08 RX ADMIN — Medication SCH ML: at 22:22

## 2022-09-08 RX ADMIN — INSULIN HUMAN SCH UNIT: 100 INJECTION, SUSPENSION SUBCUTANEOUS at 17:47

## 2022-09-08 RX ADMIN — INSULIN HUMAN SCH UNITS: 100 INJECTION, SOLUTION PARENTERAL at 17:47

## 2022-09-08 RX ADMIN — NIFEDIPINE SCH MG: 30 TABLET, FILM COATED, EXTENDED RELEASE ORAL at 09:31

## 2022-09-08 RX ADMIN — INSULIN HUMAN SCH UNIT: 100 INJECTION, SUSPENSION SUBCUTANEOUS at 08:16

## 2022-09-08 RX ADMIN — Medication SCH ML: at 12:17

## 2022-09-08 RX ADMIN — INSULIN HUMAN SCH: 100 INJECTION, SOLUTION PARENTERAL at 22:25

## 2022-09-08 RX ADMIN — NIFEDIPINE SCH: 30 TABLET, FILM COATED, EXTENDED RELEASE ORAL at 22:23

## 2022-09-08 RX ADMIN — INSULIN HUMAN SCH UNITS: 100 INJECTION, SOLUTION PARENTERAL at 12:16

## 2022-09-08 RX ADMIN — INSULIN HUMAN SCH UNITS: 100 INJECTION, SOLUTION PARENTERAL at 08:17

## 2022-09-08 RX ADMIN — ACETAMINOPHEN PRN MG: 325 TABLET ORAL at 17:58

## 2022-09-08 NOTE — PROGRESS NOTE
Assessment and Plan





Patient will be scheduled for conversion of her Vas-Cath to a PermCath versus 

placement of a new PermCath tomorrow.  Following this, the patient may be 

discharged home from a vascular point of view and can follow-up in our office 2 

weeks postop from her surgery for graft placement in her right arm.





Subjective


Date of service: 09/08/22


Principal diagnosis: End-stage renal disease


Interval history: 





Patient with a history of end-stage renal disease who presented and was 

initially underwent placement of a right internal jugular vein Vas-Cath.  She 

subsequently underwent right brachial artery to axillary vein graft placement 

yesterday.  A palpable thrill is present.  Mild ecchymosis along the medial 

aspect of the arm and the expected surgical area.  No exudates or significant 

tenderness.





Objective





- Constitutional


Vitals: 


                               Vital Signs - 12hr











  09/07/22 09/08/22





  22:00 04:03


 


Temperature  98.3 F


 


Pulse Rate  80


 


Respiratory  18





Rate  


 


Blood Pressure  141/70


 


O2 Sat by Pulse 96 96





Oximetry  











General appearance: Present: no acute distress





- EENT


Eyes: EOM intact


ENT: hearing intact





- Neck


Neck: supple, normal ROM





- Respiratory


Respiratory effort: normal


Extremities: abnormal (Right upper arm postop changes)





- Gastrointestinal


General gastrointestinal: Present: deferred


Rectal Exam: deferred





- Genitourinary


Female genitourinary: deferred





- Psychiatric


Psychiatric: appropriate mood/affect, cooperative





- Labs


CBC & Chem 7: 


                                 09/04/22 05:21





                                 09/07/22 07:45


Labs: 


                              Abnormal lab results











  09/07/22 09/07/22 09/07/22 Range/Units





  07:14 07:50 11:02 


 


POC Glucose  213 H  197 H  234 H  ()  mg/dL














  09/07/22 09/07/22 09/08/22 Range/Units





  16:03 21:14 07:43 


 


POC Glucose  361 H  357 H  197 H  ()  mg/dL














Medications & Allergies





- Medications


Allergies/Adverse Reactions: 


                                    Allergies





No Known Allergies Allergy (Verified 03/02/22 15:12)


   








Home Medications: 


                                Home Medications











 Medication  Instructions  Recorded  Confirmed  Last Taken  Type


 


Ergocalciferol(Vitamin D2)(Nf) 50 mcg PO 1XW 03/02/22 09/03/22 08/28/22 History





[Vitamin D (Nf)]     


 


Insulin Aspart Protam & Aspart 20 unit SQ BID 03/02/22 09/03/22 1 Day Ago 

History





[NovoLOG Mix 70-30 Flexpen]    ~09/02/22 


 


oxyCODONE /ACETAMINOPHEN [Percocet 1 tab PO Q6HR PRN 03/02/22 09/03/22 Unknown 

History





5/325 mg]     


 


NIFEdipine XL [Procardia Xl] 30 mg PO Q12HR #60 tablet 09/07/22  Unknown Rx


 


carvediloL [Coreg] 25 mg PO BID #60 tablet 09/07/22  Unknown Rx











Active Medications: 














Generic Name Dose Route Start Last Admin





  Trade Name Freq  PRN Reason Stop Dose Admin


 


Acetaminophen  650 mg  09/03/22 04:24  09/08/22 08:23





  Acetaminophen 325 Mg Tab  PO   650 mg





  Q4H PRN   Administration





  Pain MILD(1-3)/Fever >100.5/HA  


 


Carvedilol  25 mg  09/06/22 16:22  09/07/22 21:40





  Carvedilol 12.5 Mg Tab  PO   25 mg





  BID MARJ   Administration


 


Dextrose  0 ml  09/03/22 04:24 





  Dextrose 50% In Water (25gm) 50 Ml Syringe  IV  





  Q30MIN PRN  





  Hypoglycemia  





  Protocol  


 


Ergocalciferol  50,000 unit  09/11/22 10:00 





  Ergocalciferol (Vit D2) 50,000 Unit Cap  PO  





  [place] MARJ  


 


Heparin Sodium (Porcine)  3,000 unit  09/03/22 08:00 





  Heparin 10,000 Units/10 Ml Vial  IV  





  ANANT PRN  





  hemodialysis  


 


Hydralazine HCl  10 mg  09/03/22 06:28  09/04/22 06:11





  Hydralazine 20 Mg/1 Ml Inj  IV   10 mg





  Q4H PRN   Administration





  Hypertension  


 


Sodium Chloride  100 mls @ 999 mls/hr  09/03/22 08:00 





  Nacl 0.9%  IV  





  ANANT PRN  





  Hypotension  


 


Insulin Human Isoph/Insulin Regular  10 unit  09/06/22 09:00  09/08/22 08:16





  Insulin Nph/Regular 70/30 Inj  SUB-Q   10 unit





  BIDDIAB MARJ   Administration


 


Insulin Human Regular  0 units  09/06/22 11:30  09/08/22 08:17





  Insulin Regular, Human 100 Units/1 Ml  SUB-Q   2 units





  ACHS MARJ   Administration





  Protocol  


 


Magnesium Hydroxide  30 ml  09/03/22 04:24 





  Magnesium Hydroxide (Mom) Oral Liqd Udc  PO  





  Q4H PRN  





  Constipation  


 


Morphine Sulfate  2 mg  09/03/22 04:24 





  Morphine 2 Mg/1 Ml Inj  IV  





  Q4H PRN  





  Pain, Moderate (4-6)  


 


Morphine Sulfate  4 mg  09/03/22 04:24 





  Morphine 4 Mg/1 Ml Inj  IV  





  Q4H PRN  





  Pain , Severe (7-10)  


 


Nifedipine  30 mg  09/06/22 22:00  09/07/22 21:40





  Nifedipine Xl 30 Mg Tab  PO   30 mg





  Q12HR MARJ   Administration


 


Ondansetron HCl  4 mg  09/03/22 06:30  09/05/22 10:59





  Ondansetron 4 Mg/2 Ml Inj  IV   4 mg





  Q6H PRN   Administration





  Nausea And Vomiting  


 


Sodium Chloride  10 ml  09/03/22 10:00  09/07/22 21:42





  Sodium Chloride 0.9% 10 Ml Flush Syringe  IV   10 ml





  BID MARJ   Administration


 


Sodium Chloride  10 ml  09/03/22 04:24 





  Sodium Chloride 0.9% 10 Ml Flush Syringe  IV  





  PRN PRN  





  LINE FLUSH

## 2022-09-08 NOTE — PROGRESS NOTE
Assessment and Plan


1. ESRD:


Patient with known h/o CKD-5.


Progressed to ESRD and presented with uremia.


Patient started on hemodialysis due to worsening renal function and associated 

hyperkalemia, metabolic acidosis and uremia.


Hemodialysis: 9/3, 9/5, 9/6.


HD today.


 


2. FEN:


Hyperkalemia, improved with HD.


Metabolic acidosis, improved with HD.


Monitor lytes and volume status.





3. Hypertension:


Adjust meds as needed.


Volume control.


Monitor BP.





4. DM-2:


SSI.





9/7: s/p new R arm AVG placed.


Outpatient HD chair [facility].











Subjective:


Patient was seen and examined at the bedside.


Doing ok.











Examination:


General appearance: well-developed, well nourished, appears stated age, not in 

distress


HEENT: no icterus


Neck: trachea midline


Respiratory: ctab


Heart: S1S2, regular, no murmur


Abdomen: soft, bowel sounds heard, NT, no palpable mass


Integumentary: R arm bruises


Neurologic: AO, conversing, able to move extremities


Ext: no edema


Hemodialysis access: R IJ non-tunnel catheter, R arm AVG








Subjective


Date of service: 09/08/22





Objective





- Vital Signs


Vital signs: 


                               Vital Signs - 12hr











  09/07/22 09/07/22 09/08/22





  21:11 22:00 04:03


 


Temperature 98.7 F  98.3 F


 


Pulse Rate 81  80


 


Respiratory 18  18





Rate   


 


Blood Pressure 126/67  141/70


 


O2 Sat by Pulse 96 96 96





Oximetry   














- Lab





                                 09/04/22 05:21





                                 09/07/22 07:45


                             Most recent lab results











Calcium  8.5 mg/dL (8.4-10.2)   09/07/22  07:45    


 


Phosphorus  5.10 mg/dL (2.5-4.5)  H  09/05/22  05:58    














Medications & Allergies





- Medications


Allergies/Adverse Reactions: 


                                    Allergies





No Known Allergies Allergy (Verified 03/02/22 15:12)


   








Home Medications: 


                                Home Medications











 Medication  Instructions  Recorded  Confirmed  Last Taken  Type


 


Ergocalciferol(Vitamin D2)(Nf) 50 mcg PO 1XW 03/02/22 09/03/22 08/28/22 History





[Vitamin D (Nf)]     


 


Insulin Aspart Protam & Aspart 20 unit SQ BID 03/02/22 09/03/22 1 Day Ago 

History





[NovoLOG Mix 70-30 Flexpen]    ~09/02/22 


 


oxyCODONE /ACETAMINOPHEN [Percocet 1 tab PO Q6HR PRN 03/02/22 09/03/22 Unknown 

History





5/325 mg]     


 


NIFEdipine XL [Procardia Xl] 30 mg PO Q12HR #60 tablet 09/07/22  Unknown Rx


 


carvediloL [Coreg] 25 mg PO BID #60 tablet 09/07/22  Unknown Rx











Active Medications: 














Generic Name Dose Route Start Last Admin





  Trade Name Freq  PRN Reason Stop Dose Admin


 


Acetaminophen  650 mg  09/03/22 04:24  09/08/22 08:23





  Acetaminophen 325 Mg Tab  PO   650 mg





  Q4H PRN   Administration





  Pain MILD(1-3)/Fever >100.5/HA  


 


Carvedilol  25 mg  09/06/22 16:22  09/07/22 21:40





  Carvedilol 12.5 Mg Tab  PO   25 mg





  BID MARJ   Administration


 


Dextrose  0 ml  09/03/22 04:24 





  Dextrose 50% In Water (25gm) 50 Ml Syringe  IV  





  Q30MIN PRN  





  Hypoglycemia  





  Protocol  


 


Ergocalciferol  50,000 unit  09/11/22 10:00 





  Ergocalciferol (Vit D2) 50,000 Unit Cap  PO  





  [place] MARJ  


 


Heparin Sodium (Porcine)  3,000 unit  09/03/22 08:00 





  Heparin 10,000 Units/10 Ml Vial  IV  





  ANANT PRN  





  hemodialysis  


 


Hydralazine HCl  10 mg  09/03/22 06:28  09/04/22 06:11





  Hydralazine 20 Mg/1 Ml Inj  IV   10 mg





  Q4H PRN   Administration





  Hypertension  


 


Sodium Chloride  100 mls @ 999 mls/hr  09/03/22 08:00 





  Nacl 0.9%  IV  





  ANANT PRN  





  Hypotension  


 


Insulin Human Isoph/Insulin Regular  10 unit  09/06/22 09:00  09/08/22 08:16





  Insulin Nph/Regular 70/30 Inj  SUB-Q   10 unit





  BIDDIAB MARJ   Administration


 


Insulin Human Regular  0 units  09/06/22 11:30  09/08/22 08:17





  Insulin Regular, Human 100 Units/1 Ml  SUB-Q   2 units





  ACHS MARJ   Administration





  Protocol  


 


Magnesium Hydroxide  30 ml  09/03/22 04:24 





  Magnesium Hydroxide (Mom) Oral Liqd Udc  PO  





  Q4H PRN  





  Constipation  


 


Morphine Sulfate  2 mg  09/03/22 04:24 





  Morphine 2 Mg/1 Ml Inj  IV  





  Q4H PRN  





  Pain, Moderate (4-6)  


 


Morphine Sulfate  4 mg  09/03/22 04:24 





  Morphine 4 Mg/1 Ml Inj  IV  





  Q4H PRN  





  Pain , Severe (7-10)  


 


Nifedipine  30 mg  09/06/22 22:00  09/07/22 21:40





  Nifedipine Xl 30 Mg Tab  PO   30 mg





  Q12HR MARJ   Administration


 


Ondansetron HCl  4 mg  09/03/22 06:30  09/05/22 10:59





  Ondansetron 4 Mg/2 Ml Inj  IV   4 mg





  Q6H PRN   Administration





  Nausea And Vomiting  


 


Sodium Chloride  10 ml  09/03/22 10:00  09/07/22 21:42





  Sodium Chloride 0.9% 10 Ml Flush Syringe  IV   10 ml





  BID MARJ   Administration


 


Sodium Chloride  10 ml  09/03/22 04:24 





  Sodium Chloride 0.9% 10 Ml Flush Syringe  IV  





  PRN PRN  





  LINE FLUSH

## 2022-09-09 VITALS — DIASTOLIC BLOOD PRESSURE: 71 MMHG | SYSTOLIC BLOOD PRESSURE: 132 MMHG

## 2022-09-09 PROCEDURE — 02H633Z INSERTION OF INFUSION DEVICE INTO RIGHT ATRIUM, PERCUTANEOUS APPROACH: ICD-10-PCS | Performed by: SURGERY

## 2022-09-09 PROCEDURE — 02PAX3Z REMOVAL OF INFUSION DEVICE FROM HEART, EXTERNAL APPROACH: ICD-10-PCS | Performed by: SURGERY

## 2022-09-09 PROCEDURE — 5A1D70Z PERFORMANCE OF URINARY FILTRATION, INTERMITTENT, LESS THAN 6 HOURS PER DAY: ICD-10-PCS | Performed by: INTERNAL MEDICINE

## 2022-09-09 PROCEDURE — 0JH63XZ INSERTION OF TUNNELED VASCULAR ACCESS DEVICE INTO CHEST SUBCUTANEOUS TISSUE AND FASCIA, PERCUTANEOUS APPROACH: ICD-10-PCS | Performed by: SURGERY

## 2022-09-09 RX ADMIN — Medication SCH ML: at 09:46

## 2022-09-09 RX ADMIN — NIFEDIPINE SCH MG: 30 TABLET, FILM COATED, EXTENDED RELEASE ORAL at 09:45

## 2022-09-09 RX ADMIN — INSULIN HUMAN SCH: 100 INJECTION, SOLUTION PARENTERAL at 12:10

## 2022-09-09 RX ADMIN — INSULIN HUMAN SCH: 100 INJECTION, SOLUTION PARENTERAL at 08:00

## 2022-09-09 RX ADMIN — INSULIN HUMAN SCH UNIT: 100 INJECTION, SUSPENSION SUBCUTANEOUS at 10:03

## 2022-09-09 NOTE — PROGRESS NOTE
Assessment and Plan


1. ESRD:


Patient with known h/o CKD-5.


Progressed to ESRD and presented with uremia.


Patient started on hemodialysis due to worsening renal function and associated 

hyperkalemia, metabolic acidosis and uremia.


Hemodialysis: 9/3, 9/5, 9/6, 9/8.


HD today.


 


2. FEN:


Hyperkalemia, improved with HD.


Metabolic acidosis, improved with HD.


Monitor lytes and volume status.





3. Hypertension:


Adjust meds as needed.


Volume control.


Monitor BP.





4. DM-2:


SSI.





9/7: s/p new R arm AVG placed.


Outpatient HD chair [facility].











Subjective:


Patient was seen and examined at the bedside.


Doing ok.











Examination:


General appearance: well-developed, well nourished, appears stated age, not in 

distress


HEENT: no icterus


Neck: trachea midline


Respiratory: ctab


Heart: S1S2, regular, no murmur


Abdomen: soft, bowel sounds heard, NT, no palpable mass


Integumentary: R arm bruises


Neurologic: AO, conversing, able to move extremities


Ext: no edema


Hemodialysis access: R IJ tunnel catheter, R arm AVG








Subjective


Date of service: 09/09/22


Principal diagnosis: End-stage renal disease





Objective





- Vital Signs


Vital signs: 


                               Vital Signs - 12hr











  09/08/22 09/08/22 09/08/22





  22:00 22:13 22:22


 


Temperature  98.3 F 


 


Pulse Rate  72 72


 


Respiratory  18 





Rate   


 


Blood Pressure  136/63 136/63


 


O2 Sat by Pulse 98 98 





Oximetry   














  09/09/22





  05:04


 


Temperature 98.3 F


 


Pulse Rate 77


 


Respiratory 18





Rate 


 


Blood Pressure 149/74


 


O2 Sat by Pulse 96





Oximetry 














- Lab





                                 09/04/22 05:21





                                 09/07/22 07:45


                             Most recent lab results











Calcium  8.5 mg/dL (8.4-10.2)   09/07/22  07:45    


 


Phosphorus  5.10 mg/dL (2.5-4.5)  H  09/05/22  05:58    














Medications & Allergies





- Medications


Allergies/Adverse Reactions: 


                                    Allergies





No Known Allergies Allergy (Verified 03/02/22 15:12)


   








Home Medications: 


                                Home Medications











 Medication  Instructions  Recorded  Confirmed  Last Taken  Type


 


Ergocalciferol(Vitamin D2)(Nf) 50 mcg PO 1XW 03/02/22 09/03/22 08/28/22 History





[Vitamin D (Nf)]     


 


Insulin Aspart Protam & Aspart 20 unit SQ BID 03/02/22 09/03/22 1 Day Ago 

History





[NovoLOG Mix 70-30 Flexpen]    ~09/02/22 


 


oxyCODONE /ACETAMINOPHEN [Percocet 1 tab PO Q6HR PRN 03/02/22 09/03/22 Unknown 

History





5/325 mg]     


 


NIFEdipine XL [Procardia Xl] 30 mg PO Q12HR #60 tablet 09/07/22  Unknown Rx


 


carvediloL [Coreg] 25 mg PO BID #60 tablet 09/07/22  Unknown Rx











Active Medications: 














Generic Name Dose Route Start Last Admin





  Trade Name Freq  PRN Reason Stop Dose Admin


 


Acetaminophen  650 mg  09/03/22 04:24  09/08/22 17:58





  Acetaminophen 325 Mg Tab  PO   650 mg





  Q4H PRN   Administration





  Pain MILD(1-3)/Fever >100.5/HA  


 


Carvedilol  25 mg  09/06/22 16:22  09/08/22 22:22





  Carvedilol 12.5 Mg Tab  PO   25 mg





  BID MARJ   Administration


 


Dextrose  0 ml  09/03/22 04:24 





  Dextrose 50% In Water (25gm) 50 Ml Syringe  IV  





  Q30MIN PRN  





  Hypoglycemia  





  Protocol  


 


Ergocalciferol  50,000 unit  09/11/22 10:00 





  Ergocalciferol (Vit D2) 50,000 Unit Cap  PO  





  [place] MARJ  


 


Heparin Sodium (Porcine)  3,000 unit  09/03/22 08:00 





  Heparin 10,000 Units/10 Ml Vial  IV  





  ANANT PRN  





  hemodialysis  


 


Hydralazine HCl  10 mg  09/03/22 06:28  09/04/22 06:11





  Hydralazine 20 Mg/1 Ml Inj  IV   10 mg





  Q4H PRN   Administration





  Hypertension  


 


Sodium Chloride  100 mls @ 999 mls/hr  09/03/22 08:00 





  Nacl 0.9%  IV  





  ANANT PRN  





  Hypotension  


 


Insulin Human Isoph/Insulin Regular  10 unit  09/06/22 09:00  09/08/22 17:47





  Insulin Nph/Regular 70/30 Inj  SUB-Q   10 unit





  BIDDIAB MARJ   Administration


 


Insulin Human Regular  0 units  09/06/22 11:30  09/08/22 22:25





  Insulin Regular, Human 100 Units/1 Ml  SUB-Q   Not Given





  [facility]  





  Protocol  


 


Magnesium Hydroxide  30 ml  09/03/22 04:24 





  Magnesium Hydroxide (Mom) Oral Liqd Udc  PO  





  Q4H PRN  





  Constipation  


 


Morphine Sulfate  2 mg  09/03/22 04:24 





  Morphine 2 Mg/1 Ml Inj  IV  





  Q4H PRN  





  Pain, Moderate (4-6)  


 


Morphine Sulfate  4 mg  09/03/22 04:24 





  Morphine 4 Mg/1 Ml Inj  IV  





  Q4H PRN  





  Pain , Severe (7-10)  


 


Nifedipine  30 mg  09/06/22 22:00  09/08/22 22:23





  Nifedipine Xl 30 Mg Tab  PO   Not Given





  Q12HR MARJ  


 


Ondansetron HCl  4 mg  09/03/22 06:30  09/05/22 10:59





  Ondansetron 4 Mg/2 Ml Inj  IV   4 mg





  Q6H PRN   Administration





  Nausea And Vomiting  


 


Sodium Chloride  10 ml  09/03/22 10:00  09/08/22 22:22





  Sodium Chloride 0.9% 10 Ml Flush Syringe  IV   10 ml





  BID MARJ   Administration


 


Sodium Chloride  10 ml  09/03/22 04:24 





  Sodium Chloride 0.9% 10 Ml Flush Syringe  IV  





  PRN PRN  





  LINE FLUSH

## 2022-09-09 NOTE — OPERATIVE REPORT
Operative Report


Operative Report: 





Date of Procedure: 9/9/2022





Pre-operative Diagnosis: End-Stage Renal Disease





Post-operative Diagnosis: Same





Procedure(s):


1.  Exchange of Right Internal Jugular Vas-Cath to 23 cm Glidepath Permacath 

over Wire


2.  Radiologic Supervision with Interpretation


3.  Monitored Moderate Sedation (Total Anesthesia Time: 15 Minutes)





Surgeon: Ramon Olivier M.D. 





Assistant: None





Anesthesia: Local/Monitored Moderate Sedation





Total Anesthesia Time: 15 Minutes





EBL: Minimal





Counts: Correct





Complications: None





Condition: Stable





Findings: Right internal jugular permacath was placed with the distal tip in the

right atrium and there was no evidence of pneumothorax at the completion of the 

case.





Specimen: Indwelling Vas-Cath was discarded.





Indication:





The patient is a 52-year-old female who presented in need of urgent 

hemodialysis.  She had a Vas-Cath placed however she requires exchange of the 

Vas-Cath to a permacath for discharge.  She was given the risk, benefits, and 

alternative procedures and consented to the procedure.





Description of Procedure:





The patient was brought to the Cath Lab and laid in supine position.  After 

timeout was performed her right neck, chest, and indwelling Vas-Cath were 

prepped and draped in normal sterile fashion.  Lidocaine was used to anesthetize

the skin and soft tissue surrounding the Vas-Cath as well as the presumed tract 

for the permacath.  A 0.035 Bentson wire was advanced through the Vas-Cath and 

into the inferior vena cava to anchor the wire.  The Vas-Cath was then removed 

leaving the wire in place.  The peel-away SafeSheath was then inserted into the 

internal jugular vein by Seldinger technique.  An 11 blade was used to make a 

small stab incision on the chest wall and the permacath was then tunneled from 

the exit site on the chest to the entry site on the neck.  The tunneler was 

removed as well as removing the wire and dilator from the peel-away SafeSheath. 

The permacath was inserted into the peel-away SafeSheath and then the SafeSheath

was peeled away.  The permacath was then positioned with the distal tip in the 

right atrium.  Both ports easily aspirated and flushed and then were primed with

appropriate amount heparin.  The catheter was then secured in position with a 2-

0 Ethilon interrupted fashion.  The neck incision was then closed with a 4-0 

Monocryl in interrupted subcuticular fashion.  Dermabond was then applied to the

entry site on the neck and the catheter was dressed with a sterile dressing.  

The patient tolerated the procedure well.  All sponge, needle, and instrument 

counts were correct.  The patient was taken back to her room in stable 

condition.